# Patient Record
Sex: FEMALE | Race: BLACK OR AFRICAN AMERICAN | Employment: UNEMPLOYED | URBAN - METROPOLITAN AREA
[De-identification: names, ages, dates, MRNs, and addresses within clinical notes are randomized per-mention and may not be internally consistent; named-entity substitution may affect disease eponyms.]

---

## 2017-07-23 ENCOUNTER — HOSPITAL ENCOUNTER (EMERGENCY)
Age: 52
Discharge: HOME | End: 2017-07-23
Attending: EMERGENCY MEDICINE
Payer: SUBSIDIZED

## 2017-07-23 VITALS
SYSTOLIC BLOOD PRESSURE: 171 MMHG | RESPIRATION RATE: 16 BRPM | HEIGHT: 69 IN | HEART RATE: 97 BPM | OXYGEN SATURATION: 97 % | TEMPERATURE: 98.9 F | WEIGHT: 293 LBS | DIASTOLIC BLOOD PRESSURE: 63 MMHG | BODY MASS INDEX: 43.4 KG/M2

## 2017-07-23 DIAGNOSIS — S91.331A PENETRATING FOOT WOUND, RIGHT, INITIAL ENCOUNTER: Primary | ICD-10-CM

## 2017-07-23 PROCEDURE — 90715 TDAP VACCINE 7 YRS/> IM: CPT | Performed by: NURSE PRACTITIONER

## 2017-07-23 PROCEDURE — 74011250636 HC RX REV CODE- 250/636: Performed by: NURSE PRACTITIONER

## 2017-07-23 PROCEDURE — 99283 EMERGENCY DEPT VISIT LOW MDM: CPT | Performed by: NURSE PRACTITIONER

## 2017-07-23 PROCEDURE — 90471 IMMUNIZATION ADMIN: CPT | Performed by: NURSE PRACTITIONER

## 2017-07-23 PROCEDURE — 74011250637 HC RX REV CODE- 250/637: Performed by: NURSE PRACTITIONER

## 2017-07-23 RX ORDER — CEPHALEXIN 500 MG/1
500 CAPSULE ORAL 4 TIMES DAILY
Qty: 28 CAP | Refills: 0 | Status: SHIPPED | OUTPATIENT
Start: 2017-07-23 | End: 2017-07-30

## 2017-07-23 RX ORDER — CEPHALEXIN 500 MG/1
500 CAPSULE ORAL
Status: COMPLETED | OUTPATIENT
Start: 2017-07-23 | End: 2017-07-23

## 2017-07-23 RX ADMIN — CEPHALEXIN 500 MG: 500 CAPSULE ORAL at 17:52

## 2017-07-23 RX ADMIN — TETANUS TOXOID, REDUCED DIPHTHERIA TOXOID AND ACELLULAR PERTUSSIS VACCINE, ADSORBED 0.5 ML: 5; 2.5; 8; 8; 2.5 SUSPENSION INTRAMUSCULAR at 17:48

## 2017-07-23 NOTE — ED PROVIDER NOTES
HPI Comments: Patient presents with puncture wound to her right foot. She states she stepped on staples approx 1 week ago. She states she has had \"clear\" drainage from puncture wound. She states she is concerned about infection because she is a diabetic. No redness noted on exam. Patient states minimal tenderness to area when palpated. Patient denies fever. Patient is a 54 y.o. female presenting with skin problem. The history is provided by the patient. Skin Problem    This is a new problem. The current episode started more than 2 days ago. The problem has not changed since onset. The problem is associated with nothing. There has been no fever. The rash is present on the right foot. The pain is at a severity of 4/10. The pain is mild. The pain has been fluctuating since onset. Associated symptoms include pain. She has tried nothing for the symptoms. Past Medical History:   Diagnosis Date    Acute diastolic CHF (congestive heart failure) ([address] 3/27/8934    Acute systolic heart failure ([address] 1/21/2016    CHF (congestive heart failure) ([address] 1/19/2016    Chronic systolic heart failure ([address] 3/3/2016    Diabetes ([address]     diet controlled    DVT (deep venous thrombosis) ([address] 1/24/2016    Dyspnea 1/21/2016    HTN (hypertension) 1/19/2016    Hypertension     in past    Morbid obesity ([address] 1/19/2016    SVT (supraventricular tachycardia) ([address] 1/20/2016    Ventricular tachycardia (paroxysmal) ([address] 1/23/2016       Past Surgical History:   Procedure Laterality Date    ABDOMEN SURGERY PROC UNLISTED      gastric bypass    GASTRIC BYPASS,OBESE<150CM REYNALDO-EN-Y      HX PACEMAKER           History reviewed. No pertinent family history. Social History     Social History    Marital status:      Spouse name: N/A    Number of children: N/A    Years of education: N/A     Occupational History    Not on file.      Social History Main Topics    Smoking status: Never Smoker    Smokeless tobacco: Never Used    Alcohol use No    Drug use: No    Sexual activity: No     Other Topics Concern    Not on file     Social History Narrative         ALLERGIES: Review of patient's allergies indicates no known allergies. Review of Systems   Constitutional: Negative for chills and fever. Respiratory: Negative for cough and shortness of breath. Cardiovascular: Negative for chest pain. Gastrointestinal: Negative for abdominal pain. Musculoskeletal: Negative for arthralgias and myalgias. Skin: Positive for wound. Neurological: Negative for dizziness, weakness, numbness and headaches. Vitals:    07/23/17 1644   BP: 171/63   Pulse: 97   Resp: 16   Temp: 98.9 °F (37.2 °C)   SpO2: 97%   Weight: (!) 195 kg (430 lb)   Height: 5' 9\" (1.753 m)            Physical Exam   Constitutional: No distress. Cardiovascular: Normal rate and regular rhythm. No murmur heard. Pulmonary/Chest: Effort normal and breath sounds normal.   Musculoskeletal:        Right foot: There is tenderness. There is no swelling, normal capillary refill and no deformity. Feet:    Skin: Skin is warm and dry. She is not diaphoretic. No pallor. Psychiatric: She has a normal mood and affect. Her behavior is normal.   Nursing note and vitals reviewed. MDM  Number of Diagnoses or Management Options  Penetrating foot wound, right, initial encounter:   Diagnosis management comments: Patient given prescription for keflex. Patient states she currently does not have pcp because she does not have insurance at this time. I will give her contact information to social work in order to establish follow up with either pcp or wound care.      Patient Progress  Patient progress: stable    ED Course       Procedures

## 2017-07-23 NOTE — ED NOTES
Triple antibiotic ointment and nonadherent dressing applied to wound on plantar aspect of right foot.

## 2017-07-23 NOTE — ED TRIAGE NOTES
Patient advises that she stepped on a staple tee 1 week ago and was able to remove staple at that time. Patient advises wound is having some drainage at this time. Unable to visualize in triage.

## 2017-07-23 NOTE — DISCHARGE INSTRUCTIONS
Puncture Wounds: Care Instructions  Your Care Instructions    A puncture wound can happen anywhere on your body. These wounds tend to be narrower and deeper than cuts. A puncture wound is usually left open instead of being closed. This is because a puncture wound can be easily infected, and closing it can make infection even more likely. You will probably have a bandage over the wound. The doctor has checked you carefully, but problems can develop later. If you notice any problems or new symptoms, get medical treatment right away. Follow-up care is a key part of your treatment and safety. Be sure to make and go to all appointments, and call your doctor if you are having problems. It's also a good idea to know your test results and keep a list of the medicines you take. How can you care for yourself at home? · Keep the wound dry for the first 24 to 48 hours. After this, you can shower if your doctor okays it. Pat the wound dry. · Don't soak the wound, such as in a bathtub. Your doctor will tell you when it's safe to get the wound wet. · If your doctor told you how to care for your wound, follow your doctor's instructions. If you did not get instructions, follow this general advice:  ¨ After the first 24 to 48 hours, wash the wound with clean water 2 times a day. Don't use hydrogen peroxide or alcohol, which can slow healing. ¨ You may cover the wound with a thin layer of petroleum jelly, such as Vaseline, and a nonstick bandage. ¨ Apply more petroleum jelly and replace the bandage as needed. · Prop up the sore area on pillows anytime you sit or lie down during the next 3 days. Try to keep it above the level of your heart. This helps reduce swelling. · Avoid any activity that could cause your wound to get worse. · Be safe with medicines. Read and follow all instructions on the label. ¨ If the doctor gave you a prescription medicine for pain, take it as prescribed.   ¨ If you are not taking a prescription pain medicine, ask your doctor if you can take an over-the-counter medicine. · If your doctor prescribed antibiotics, take them as directed. Do not stop taking them just because you feel better. You need to take the full course of antibiotics. When should you call for help? Call your doctor now or seek immediate medical care if:  · You have new pain, or your pain gets worse. · The wound starts to bleed, and blood soaks through the bandage. Oozing small amounts of blood is normal.  · The skin near the wound is cold or pale or changes color. · You have tingling, weakness, or numbness near the wound. · You have trouble moving the area near the wound. · You have symptoms of infection, such as:  ¨ Increased pain, swelling, warmth, or redness around the wound. ¨ Red streaks leading from the wound. ¨ Pus draining from the wound. ¨ A fever. Watch closely for changes in your health, and be sure to contact your doctor if:  · The wound is not closing (getting smaller). · You do not get better as expected. Where can you learn more? Go to http://terri-arlette.info/. Enter F879 in the search box to learn more about \"Puncture Wounds: Care Instructions. \"  Current as of: March 20, 2017  Content Version: 11.3  © 7626-2136 [employer]. Care instructions adapted under license by [employer] (which disclaims liability or warranty for this information). If you have questions about a medical condition or this instruction, always ask your healthcare professional. [place] [address] any warranty or liability for your use of this information.

## 2017-07-24 NOTE — PROGRESS NOTES
Referral from weekend staff. Patient states does not have a PCP and also has no insurance. Noted that patient referred to [facility] program 1/26/16 and was suppose to have appointment with Heike Nevarez 2/3/16. Call to Heike Nevarez who states patient is not on active list.  Emailed another referral to Kayla Hagen with patient information.

## 2017-08-24 NOTE — PROGRESS NOTES
Patient called. States she is already established with [facility] and has an appointment there next Tuesday.

## 2017-08-30 ENCOUNTER — HOSPITAL ENCOUNTER (OUTPATIENT)
Dept: WOUND CARE | Age: 52
Discharge: HOME | End: 2017-08-30
Attending: PODIATRIST
Payer: MEDICAID

## 2017-08-30 PROCEDURE — 99214 OFFICE O/P EST MOD 30 MIN: CPT

## 2017-10-25 ENCOUNTER — HOSPITAL ENCOUNTER (OUTPATIENT)
Dept: WOUND CARE | Age: 52
Discharge: HOME | End: 2017-10-25
Attending: PODIATRIST
Payer: MEDICAID

## 2017-10-25 PROCEDURE — 97597 DBRDMT OPN WND 1ST 20 CM/<: CPT

## 2017-10-25 PROCEDURE — 99213 OFFICE O/P EST LOW 20 MIN: CPT

## 2017-11-22 ENCOUNTER — HOSPITAL ENCOUNTER (OUTPATIENT)
Dept: WOUND CARE | Age: 52
Discharge: HOME | End: 2017-11-22
Attending: PODIATRIST
Payer: MEDICAID

## 2017-11-22 PROCEDURE — 99213 OFFICE O/P EST LOW 20 MIN: CPT

## 2017-11-22 PROCEDURE — 11055 PARING/CUTG B9 HYPRKER LES 1: CPT

## 2017-12-20 ENCOUNTER — HOSPITAL ENCOUNTER (OUTPATIENT)
Dept: WOUND CARE | Age: 52
Discharge: HOME | End: 2017-12-20
Attending: PODIATRIST
Payer: MEDICAID

## 2017-12-20 PROCEDURE — 77030022298 HC DRSG ANTIMIC ACTICT S&N -A

## 2017-12-20 PROCEDURE — 97597 DBRDMT OPN WND 1ST 20 CM/<: CPT

## 2018-06-06 ENCOUNTER — HOSPITAL ENCOUNTER (OUTPATIENT)
Dept: WOUND CARE | Age: 53
Discharge: HOME | End: 2018-06-06
Attending: SURGERY
Payer: MEDICAID

## 2018-06-06 PROCEDURE — 97597 DBRDMT OPN WND 1ST 20 CM/<: CPT

## 2018-06-06 PROCEDURE — 99214 OFFICE O/P EST MOD 30 MIN: CPT

## 2018-06-07 ENCOUNTER — APPOINTMENT (OUTPATIENT)
Dept: WOUND CARE | Age: 53
End: 2018-06-07
Attending: SURGERY
Payer: MEDICAID

## 2018-07-11 ENCOUNTER — HOSPITAL ENCOUNTER (OUTPATIENT)
Dept: WOUND CARE | Age: 53
Discharge: HOME | End: 2018-07-11
Attending: SURGERY
Payer: MEDICAID

## 2018-07-11 PROCEDURE — 97597 DBRDMT OPN WND 1ST 20 CM/<: CPT

## 2018-08-01 ENCOUNTER — HOSPITAL ENCOUNTER (OUTPATIENT)
Dept: WOUND CARE | Age: 53
Discharge: HOME | End: 2018-08-01
Attending: SURGERY
Payer: MEDICAID

## 2018-08-01 PROCEDURE — 99213 OFFICE O/P EST LOW 20 MIN: CPT

## 2020-09-09 ENCOUNTER — HOSPITAL ENCOUNTER (OUTPATIENT)
Dept: WOUND CARE | Age: 55
Discharge: HOME | End: 2020-09-09
Attending: PODIATRIST
Payer: COMMERCIAL

## 2020-09-09 VITALS
TEMPERATURE: 98.1 F | SYSTOLIC BLOOD PRESSURE: 152 MMHG | BODY MASS INDEX: 43.4 KG/M2 | HEART RATE: 83 BPM | DIASTOLIC BLOOD PRESSURE: 90 MMHG | HEIGHT: 69 IN | WEIGHT: 293 LBS

## 2020-09-09 PROCEDURE — 97597 DBRDMT OPN WND 1ST 20 CM/<: CPT

## 2020-09-09 PROCEDURE — 99213 OFFICE O/P EST LOW 20 MIN: CPT

## 2020-09-09 PROCEDURE — 87077 CULTURE AEROBIC IDENTIFY: CPT

## 2020-09-09 PROCEDURE — 11045 DBRDMT SUBQ TISS EACH ADDL: CPT

## 2020-09-09 PROCEDURE — 87205 SMEAR GRAM STAIN: CPT

## 2020-09-09 PROCEDURE — 11042 DBRDMT SUBQ TIS 1ST 20SQCM/<: CPT

## 2020-09-09 PROCEDURE — 87186 SC STD MICRODIL/AGAR DIL: CPT

## 2020-09-09 RX ORDER — GABAPENTIN 300 MG/1
300 CAPSULE ORAL 3 TIMES DAILY
COMMUNITY

## 2020-09-09 RX ORDER — SODIUM HYPOCHLORITE 2.5 MG/ML
5 SOLUTION TOPICAL DAILY
Qty: 1 BOTTLE | Refills: 1 | Status: SHIPPED | OUTPATIENT
Start: 2020-09-09 | End: 2020-10-21

## 2020-09-09 NOTE — PROCEDURES
Wound Center Debridement    Patient: Una Mcgovern MRN: [id number]  SSN: [id number]    YOB: 1965  Age: 48 y.o.   Sex: female      September 9, 2020     Procedure Performed By: Favian Durant DPM    Wound: 2 Left  Non Pressure  Foot To Fat Layer    Type of Debridement:  Surgical      Time Out Taken: Yes    Pain Control: Insensate    Level: Skin/Subcutaneous Tissue    Type of Tissue Removed: Slough and Subcutaneous    Frequency of Debridements: PRN    Consent in chart     Instrument: Blade     Bleeding: Minimal     Hemostasis: Pressure     Procedural Pain: Insensate    Post-Procedural Pain: Insensate    Pre-Debridement Measurements: 5.5 x 5 x 0.2 cm    Post-Debridement Measurements: 4 x 3 x 0.2 cm    Surface Area Debrided: 12 sq. cm    Response to Procedure: tolerated the procedure well with no complications

## 2020-09-09 NOTE — WOUND CARE
09/09/20 1051 Wound Foot Right;Plantar #1 Date First Assessed/Time First Assessed: 09/09/20 1050   Present on Hospital Admission: Yes  Wound Approximate Age at First Assessment (Weeks): 8 weeks  Primary Wound Type: Diabetic  Location: Foot  Wound Location Orientation: Right;Plantar  Wound Kiran. .. Dressing Status Old drainage Dressing Type Adhesive wound dressing (Band-Aid) Non-staged Wound Description Full thickness Wound Length (cm) 1.6 cm Wound Width (cm) 2.3 cm Wound Depth (cm) 0.7 cm Wound Surface Area (cm^2) 3.68 cm^2 Wound Volume (cm^3) 2.58 cm^3 Condition of Base Granulation Condition of Edges Calloused Tissue Type Percent Red 100 Drainage Amount Moderate Drainage Color Serosanguinous Wound Odor None Cleansing and Cleansing Agents  Normal saline Wound Heel Left;Lateral #2 Date First Assessed/Time First Assessed: 09/09/20 1051   Present on Hospital Admission: Yes  Wound Approximate Age at First Assessment (Weeks): 7 weeks  Primary Wound Type: Diabetic  Location: Heel  Wound Location Orientation: Left;Lateral  Wound Desc. .. Dressing Type Open to air Non-staged Wound Description Full thickness Wound Length (cm) 5.5 cm Wound Width (cm) 5 cm Wound Depth (cm) 0.2 cm Wound Surface Area (cm^2) 27.5 cm^2 Wound Volume (cm^3) 5.5 cm^3 Condition of [facility]; Other (comment) 
(loose callous) Tissue Type Percent Yellow 100 Drainage Amount Large Drainage Color Serosanguinous Wound Odor Foul Cleansing and Cleansing Agents  Normal saline

## 2020-09-09 NOTE — WOUND CARE
[address] [place] [address] [address] Phone: [phone] Fax: [phone] Patient: Gloria Aleman MRN: [id number]  SSN: [id number] YOB: 1965  Age: 48 y.o. Sex: female Return Appointment: 2 weeks with Nasima Lopze DPM 
 
Instructions: Left foot: 
Clean wound with saline. Dakin's solution 0.25%-apply to wound on gauze or packing strip. Cover with ABD and wrap with rolled gauze. Change daily. *wound culture obtained of left foot wound today. Right foot: 
Clean wound with saline. Apply collagen with silver to wound bed. Cover with ABD and wrap with rolled gauze. Change every other day. Do not get wounds in shower. May purchase cast cover to keep dry. Patient is to remain out of work for 6 weeks. Should you experience increased redness, swelling, pain, foul odor, size of wound(s), or have a temperature over 101 degrees please contact the [address] [place] [address] Road at [phone] or if after hours contact your primary care physician or go to the hospital emergency department. Signed By: Reginaldo Lei RN September 9, 2020

## 2020-09-09 NOTE — WOUND CARE
[facility] September 9, 2020 RE: Kj Sandoval To Whom It May Concern, This is to certify that Kj Sandoval is to remain out of work for 6 weeks. Approximate return date is 10/21/2020. Please feel free to contact my office if you have any questions or concerns. Thank you for your assistance in this matter. Sincerely, 
Dr. Clare Kenney, RN 
 
[address] [place] [address] Phone: [phone] Fax: [phone]

## 2020-09-09 NOTE — PROCEDURES
Wound Center Debridement    Patient: Valentin Rodas MRN: [id number]  SSN: [id number]    YOB: 1965  Age: 48 y.o.   Sex: female      September 9, 2020     Procedure Performed By: Annamaria Cruz DPM    Wound: 1 Right  Non Pressure  Foot Breakdown of Skin     Type of Debridement:  Selective      Time Out Taken: Yes    Pain Control: Insensate      Type of Tissue Removed: Biofilm and Callus    Frequency of Debridements: PRN    Consent in chart     Instrument: Blade     Bleeding: Minimal     Hemostasis: Pressure     Procedural Pain: Insensate    Post-Procedural Pain: Insensate    Pre-Debridement Measurements: 1.6 x 2.3 x 0.7 cm    Post-Debridement Measurements: 1.6 x 2.3 x 0.4 cm    Surface Area Debrided: 3.68 sq. cm    Response to Procedure: tolerated the procedure well with no complications

## 2020-09-09 NOTE — PROGRESS NOTES
Wound Center Progress Note    Patient: Ave Canseco MRN: [id number]  SSN: [id number]    YOB: 1965  Age: 48 y.o. Sex: female      Subjective:     Chief Complaint:  Ave Canseco is a 48 y.o. BLACK OR  female who presents with a wound to the left lower extremity at the plantar lateral heel and to the sub 5th metatarsal head right foot . These began 2 months ago after a long car ride. She has not been treating at home. She notes odor to the left foot. She has moved to [facility] since last seen here but wants to travel here for care. She currently works on her feet daily. Her last A1c was 6.6. History of Present Illness:     Nature: Painless  Location: as above  Duration: July 2020  Onset: Patient states it started as pressure  Course: Worsening  Aggravating/Alleviating: Worsened with pressure and dependency, improved with rest  Treatment: none    Past Medical History:   Diagnosis Date    Acute diastolic CHF (congestive heart failure) ([address] 9/38/6713    Acute systolic heart failure ([address] 1/21/2016    CHF (congestive heart failure) ([address] 1/19/2016    Chronic systolic heart failure ([address] 3/3/2016    Diabetes ([address]     diet controlled    DVT (deep venous thrombosis) ([address] 1/24/2016    Dyspnea 1/21/2016    HTN (hypertension) 1/19/2016    Hypertension     in past    Morbid obesity ([address] 1/19/2016    SVT (supraventricular tachycardia) ([address] 1/20/2016    Ventricular tachycardia (paroxysmal) ([address] 1/23/2016      Past Surgical History:   Procedure Laterality Date    ABDOMEN SURGERY PROC UNLISTED      gastric bypass    GASTRIC BYPASS,OBESE<150CM REYNALDO-EN-Y      HX PACEMAKER       History reviewed. No pertinent family history. Social History     Tobacco Use    Smoking status: Never Smoker    Smokeless tobacco: Never Used   Substance Use Topics    Alcohol use: No       Prior to Admission medications    Medication Sig Start Date End Date Taking?  Authorizing Provider   gabapentin (NEURONTIN) 300 mg capsule Take 300 mg by mouth three (3) times daily. Yes Provider, Historical   losartan (COZAAR) 50 mg tablet Take 1 Tab by mouth daily. 6/15/17   Mayra Guerrier MD   multivitamin, tx-iron-ca-min (THERA-M W/ IRON) 9 mg iron-400 mcg tab tablet Take 1 Tab by mouth daily. Provider, Historical   aspirin (ASPIRIN) 325 mg tablet Take 1 Tab by mouth daily. 8/31/16   Mayra Guerrier MD   spironolactone (ALDACTONE) 25 mg tablet Take  by mouth daily. Provider, Historical   furosemide (LASIX) 40 mg tablet Take 1 Tab by mouth two (2) times a day. /Dr Sonja Gary [place] [facility] Lic #[id number]  Patient taking differently: Take 40 mg by mouth daily. /Dr Romelia José #[id number] 1/27/16   Nader Organ., PA   carvedilol (COREG) 25 mg tablet Take 1 Tab by mouth two (2) times daily (with meals). /Dr Sonja Gary [place] [facility] Lic #[id number] 1/27/16   [place] Organ., PA     Allergies   Allergen Reactions    Metformin Nausea Only        Review of Systems:  The patient has no difficulty with chest pain or shortness of breath. No fever or chills. No Nausea, vomiting or diarrhea. Comprehensive review of systems was otherwise unremarkable except as noted above. Lab Results   Component Value Date/Time    Hemoglobin A1c 7.0 (H) 01/20/2016 04:55 AM        Immunization History   Administered Date(s) Administered    Influenza Vaccine (Quad) PF 01/27/2016    Pneumococcal Polysaccharide (PPSV-23) 01/27/2016    Tdap 07/23/2017       Body mass index is 73.6 kg/m². Counseling regarding nutrition done: Yes. Recommend Jaden nutritional supplement for wound healing. Current medications:  Current Outpatient Medications   Medication Sig Dispense Refill    gabapentin (NEURONTIN) 300 mg capsule Take 300 mg by mouth three (3) times daily.  losartan (COZAAR) 50 mg tablet Take 1 Tab by mouth daily.  30 Tab 1    multivitamin, tx-iron-ca-min (THERA-M W/ IRON) 9 mg iron-400 mcg tab tablet Take 1 Tab by mouth daily.  aspirin (ASPIRIN) 325 mg tablet Take 1 Tab by mouth daily. 90 Tab 3    spironolactone (ALDACTONE) 25 mg tablet Take  by mouth daily.  furosemide (LASIX) 40 mg tablet Take 1 Tab by mouth two (2) times a day. /Dr Clotilde Carroll [facility] Lic #[id number] (Patient taking differently: Take 40 mg by mouth daily. /Dr Clotilde Carroll [facility] Lic #[id number]) 60 Tab 0    carvedilol (COREG) 25 mg tablet Take 1 Tab by mouth two (2) times daily (with meals). /Dr Clotilde Carroll [facility] Lic #[id number] [facility]#[id number] 60 Tab 0     Current Facility-Administered Medications   Medication Dose Route Frequency Provider Last Rate Last Dose    sodium hypochlorite (HALF STRENGTH DAKIN'S) 0.25% irrigation (bottle) 30 mL  30 mL Topical ONCE Lisandra Chavez DPM             Objective:     Physical Exam:     Visit Vitals  /90 (BP 1 Location: Left arm, BP Patient Position: At rest)   Pulse 83   Temp 98.1 °F (36.7 °C)   Ht 5' 9\" (1.753 m)   Wt (!) 226.1 kg (498 lb 6.4 oz)   BMI 73.60 kg/m²       General: well developed, well nourished, pleasant , NAD. Hygiene good  Psych: cooperative. Pleasant. No anxiety or depression. Normal mood and affect. HEENT: Normocephalic, atraumatic. EOMI. Conjunctiva clear. No scleral icterus. Neck: Normal range of motion. No masses. Chest: Good air entry bilaterally. Respirations nonlabored  Cardio: Normal heart sounds,no rubs, murmurs or gallops  Abdomen: Soft, nontender, nondistended, normoactive bowel sounds    Vascular: DP and PT pulses are palpable at 2/4 bilateral. Skin temperature is uniform from proximal to distal bilateral. Hair growth is absent bilateral. No erythema, edema, heat is appreciated bilateral. No evidence of cellulitis. Derm: Nails 1-5 bilateral are thickened, discolored and with subungual debris. No paronychial infections are noted. Skin is atrophic and xerotic.  No subcutaneous masses or hyperpigmented lesions are present. Neuro: Epicritic sensation is absent bilateral. Protective sensation is absent with 5.07 SWMF testing to all 10 sites bilateral. Muscle tone and bulk is symmetric bilateral.  Msk: Muscle strength is 5/5 for all prime movers of the foot bilateral. ROM of all joints is full and fluid without pain. No effusions are palpable. Full thickness ulceration is noted to the plantar lateral left heel. Hyperkeratotic rim with fibrous, stringy base. No focal erythema, edema, purulence. Malodor is present. Soft end feel with no bone exposed or palpable. No undermining or sinus track is noted. No fluctuance with palpation. Additional wound to the sub 5th metatarsal head right foot with thick hyperkeratotic rim and granular bed. No odor.              Ulcer Description:   Wound Foot Right;Plantar #1 (Active)   Dressing Status Old drainage 09/09/20 1051   Dressing Type Adhesive wound dressing (Band-Aid) 09/09/20 1051   Non-staged Wound Description Full thickness 09/09/20 1051   Banks Grading Scale 0-5  Grade 2 09/09/20 1051   Wound Length (cm) 1.6 cm 09/09/20 1051   Wound Width (cm) 2.3 cm 09/09/20 1051   Wound Depth (cm) 0.7 cm 09/09/20 1051   Wound Surface Area (cm^2) 3.68 cm^2 09/09/20 1051   Wound Volume (cm^3) 2.58 cm^3 09/09/20 1051   Condition of Base Granulation 09/09/20 1051   Condition of Edges Calloused 09/09/20 1051   Tissue Type Percent Red 100 09/09/20 1051   Drainage Amount Moderate 09/09/20 1051   Drainage Color Serosanguinous 09/09/20 1051   Wound Odor None 09/09/20 1051   Cleansing and Cleansing Agents  Normal saline 09/09/20 1051   Number of days: 0       Wound Heel Left;Lateral #2 (Active)   Dressing Type Open to air 09/09/20 1051   Non-staged Wound Description Full thickness 09/09/20 1051   Banks Grading Scale 0-5  Grade 3 09/09/20 1051   Wound Length (cm) 5.5 cm 09/09/20 1051   Wound Width (cm) 5 cm 09/09/20 1051   Wound Depth (cm) 0.2 cm 09/09/20 1051   Wound Surface Area (cm^2) 27.5 cm^2 09/09/20 1051   Wound Volume (cm^3) 5.5 cm^3 09/09/20 1051   Condition of [facility]; Other (comment) 09/09/20 1051   Tissue Type Percent Yellow 100 09/09/20 1051   Drainage Amount Large 09/09/20 1051   Drainage Color Serosanguinous 09/09/20 1051   Wound Odor Foul 09/09/20 1051   Cleansing and Cleansing Agents  Normal saline 09/09/20 1051   Number of days: 0                 Data Review:   No results found for this or any previous visit (from the past 336 hour(s)). I independently reviewed recent labs, pathology reports, microbiology reports and radiology studies as noted above. Assessment:     48 y.o. female with diabetic ulceration to the left heel and right sub 5th metatarsal head    Plan:     Patient was examined and evaluated today. They were educated on the barriers to healing. Culture taken left heel wound  - will prescribe when results received. Surgical debridement performed left heel with #15 blade - refer to debridement note. Begin dakins daily to the wound. Right wound with selective debridement. Begin collagen with silver. Recommend 6 weeks out of work to allo full healing. Return in 2 weeks due to distance. Recommend referral to closer facility and she declined. Any procedures done today in the wound center are documented in a seperate note in connect care made part of this record by reference. Patient instructed on the following: Follow all wound dressing instructions. Do not get dressing or wound wet. May shower if wound can be effectively kept dry. Cast covers may be purchased at [facility] and [facility]. Should you experience increased redness, swelling, pain, foul odor, size of wound(s), or have a temperature over 101 degrees please contact the [address] [place] [address] Road at [phone] or if after hours contact your primary care physician or go to the hospital emergency department.     Orders Placed This Encounter    CULTURE, WOUND W GRAM STAIN     Left foot wound     Standing Status: Standing     Number of Occurrences:   1    WOUND CARE, DRESSING CHANGE     Left foot:  Clean wound with saline. Dakin's solution 0.25%-apply to wound on gauze or packing strip. Cover with ABD and wrap with rolled gauze. Change daily. *wound culture obtained of left foot wound today. Right foot:  Clean wound with saline. Apply collagen with silver to wound bed. Cover with ABD and wrap with rolled gauze. Change every other day. Do not get wounds in shower. May purchase cast cover to keep dry. Standing Status:   Standing     Number of Occurrences:   1    gabapentin (NEURONTIN) 300 mg capsule     Sig: Take 300 mg by mouth three (3) times daily.  sodium hypochlorite (HALF STRENGTH DAKIN'S) 0.25% irrigation (bottle) 30 mL    sodium hypochlorite (Dakin's Solution) external solution     Sig: Apply 5 mL to affected area daily.      Dispense:  1 Bottle     Refill:  1       Follow-up Information     Follow up With Specialties Details Why Contact Info    [address] In 2 weeks  [facility] [address] [place] [id number]  [phone]             Signed By: Lizette Hill DPM     September 9, 2020

## 2020-09-13 LAB
BACTERIA SPEC CULT: ABNORMAL
GRAM STN SPEC: ABNORMAL
SERVICE CMNT-IMP: ABNORMAL

## 2020-09-15 RX ORDER — CEPHALEXIN 500 MG/1
500 CAPSULE ORAL 3 TIMES DAILY
COMMUNITY
Start: 2020-09-15 | End: 2020-09-24

## 2020-09-23 ENCOUNTER — HOSPITAL ENCOUNTER (OUTPATIENT)
Dept: WOUND CARE | Age: 55
Discharge: HOME | End: 2020-09-23
Attending: PODIATRIST
Payer: COMMERCIAL

## 2020-09-23 VITALS
WEIGHT: 293 LBS | HEIGHT: 69 IN | DIASTOLIC BLOOD PRESSURE: 98 MMHG | OXYGEN SATURATION: 95 % | RESPIRATION RATE: 20 BRPM | HEART RATE: 92 BPM | SYSTOLIC BLOOD PRESSURE: 148 MMHG | TEMPERATURE: 98 F | BODY MASS INDEX: 43.4 KG/M2

## 2020-09-23 PROCEDURE — 97597 DBRDMT OPN WND 1ST 20 CM/<: CPT

## 2020-09-23 NOTE — WOUND CARE
09/23/20 1123 Wound Foot Right;Plantar #1 Date First Assessed/Time First Assessed: 09/09/20 1050   Present on Hospital Admission: Yes  Wound Approximate Age at First Assessment (Weeks): 8 weeks  Primary Wound Type: Diabetic  Location: Foot  Wound Location Orientation: Right;Plantar  Wound Kiran. .. Dressing Status Old drainage Dressing Type  
(Collagen, Bandaid) Non-staged Wound Description Full thickness Wound Length (cm) 2 cm Wound Width (cm) 1.7 cm Wound Depth (cm) 0.5 cm Wound Surface Area (cm^2) 3.4 cm^2 Wound Volume (cm^3) 1.7 cm^3 Change in Wound Size % 7.61 Condition of Base Granulation Condition of Edges Calloused Tissue Type Percent Red 100 Drainage Amount Moderate Drainage Color Serosanguinous Wound Odor None Susana-wound Assessment Intact Cleansing and Cleansing Agents  Normal saline Wound Heel Left;Lateral #2 Date First Assessed/Time First Assessed: 09/09/20 1051   Present on Hospital Admission: Yes  Wound Approximate Age at First Assessment (Weeks): 7 weeks  Primary Wound Type: Diabetic  Location: Heel  Wound Location Orientation: Left;Lateral  Wound Desc. .. Dressing Status Breakthrough drainage Dressing Type (Dakins, ABD, Rolled GAuze) Non-staged Wound Description Full thickness Wound Length (cm) 5.3 cm Wound Width (cm) 4 cm Wound Depth (cm) 0.6 cm Wound Surface Area (cm^2) 21.2 cm^2 Wound Volume (cm^3) 12.72 cm^3 Change in Wound Size % 22.91 Condition of Base Slough;Granulation;Eschar Tissue Type Percent Red 80 Tissue Type Percent Yellow 20 Drainage Amount Large Drainage Color Serosanguinous Wound Odor Mild Susana-wound Assessment Intact Cleansing and Cleansing Agents  Normal saline Patient takes ASA 325mg Daily

## 2020-09-23 NOTE — WOUND CARE
[address] [place] [address], [facility] [address] Phone: [phone] Fax: [phone] Patient: Kal Quinones MRN: [id number]  SSN: [id number] YOB: 1965  Age: 48 y.o. Sex: female Return Appointment: 2 weeks with Joseph Fortune DPM 
 
Instructions: Left foot: 
Clean wound with saline. Dakin's solution 0.25%-apply to wound on gauze or packing strip. Cover with ABD and wrap with rolled gauze. Change daily. 
 
  
Right foot: 
Clean wound with saline. Apply collagen with silver to wound bed. Cover with ABD and wrap with rolled gauze. Change every other day. 
  
Do not get wounds in shower. May purchase cast cover to keep dry. 
  
Patient is to remain out of work for 6 weeks Should you experience increased redness, swelling, pain, foul odor, size of wound(s), or have a temperature over 101 degrees please contact the [address] [place] [address] at [phone] or if after hours contact your primary care physician or go to the hospital emergency department. Signed By: Kalen Spence RN September 23, 2020

## 2020-09-23 NOTE — DISCHARGE INSTRUCTIONS
[address]  [address], [address]   Phone: [phone]  Fax: [phone]    Patient: Valentin Rodas MRN: [id number]  SSN: [id number]    YOB: 1965  Age: 48 y.o. Sex: female       Return Appointment: 2 weeks with Annamaria Cruz DPM    Instructions: Left foot:  Clean wound with saline. Dakin's solution 0.25%-apply to wound on gauze or packing strip. Cover with ABD and wrap with rolled gauze. Change daily.       Right foot:  Clean wound with saline. Apply collagen with silver to wound bed. Cover with ABD and wrap with rolled gauze. Change every other day.     Do not get wounds in shower. May purchase cast cover to keep dry.     Patient is to remain out of work for 6 weeks    Should you experience increased redness, swelling, pain, foul odor, size of wound(s), or have a temperature over 101 degrees please contact the [address] [place] [address] Road at [phone] or if after hours contact your primary care physician or go to the hospital emergency department.     Signed By: Amor Antonio RN     September 23, 2020

## 2020-09-23 NOTE — PROCEDURES
Wound Center Debridement    Patient: Isabel Sumner MRN: [id number]  SSN: [id number]    YOB: 1965  Age: 48 y.o.   Sex: female      September 23, 2020     Procedure Performed By: Angeles Garcia DPM    Wound: 2 Left  Non Pressure  Heel & Midfoot Breakdown of Skin     Type of Debridement:  Selective      Time Out Taken: Yes    Pain Control: Insensate      Type of Tissue Removed: Callus, Dermis and Epidermis    Frequency of Debridements: PRN    Consent in chart     Instrument: Blade     Bleeding: Minimal     Hemostasis: n/a      Procedural Pain: Insensate    Post-Procedural Pain: Insensate    Pre-Debridement Measurements: 5.3 x 4.0 x 0.6 cm    Post-Debridement Measurements: 5.3 x 4.0 x 0.6 cm    Surface Area Debrided: 21.2 sq. cm    Response to Procedure: tolerated the procedure well with no complications

## 2020-09-23 NOTE — PROGRESS NOTES
Wound Center Progress Note    Patient: Gloria Aleman MRN: [id number]  SSN: [id number]    YOB: 1965  Age: 48 y.o. Sex: female      Subjective:     Chief Complaint:  Gloria Aleman is a 48 y.o. 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 She received her oral antibiotics and remains on them. She notes no remaining odor and less drainage. History of Present Illness:     Nature: Painless  Location: as above  Duration: July 2020  Onset: Patient states it started as pressure  Course: Improving  Aggravating/Alleviating: Worsened with pressure and dependency, improved with rest  Treatment: dakins left, collagen with silver right    Past Medical History:   Diagnosis Date    Acute diastolic CHF (congestive heart failure) ([address] 7/95/0747    Acute systolic heart failure ([address] 1/21/2016    CHF (congestive heart failure) ([address] 1/19/2016    Chronic systolic heart failure ([address] 3/3/2016    Diabetes ([address]     diet controlled    DVT (deep venous thrombosis) ([address] 1/24/2016    Dyspnea 1/21/2016    HTN (hypertension) 1/19/2016    Hypertension     in past    Morbid obesity ([address] 1/19/2016    SVT (supraventricular tachycardia) ([address] 1/20/2016    Ventricular tachycardia (paroxysmal) ([address] 1/23/2016      Past Surgical History:   Procedure Laterality Date    ABDOMEN SURGERY PROC UNLISTED      gastric bypass    GASTRIC BYPASS,OBESE<150CM REYNALDO-EN-Y      HX PACEMAKER       History reviewed. No pertinent family history. Social History     Tobacco Use    Smoking status: Never Smoker    Smokeless tobacco: Never Used   Substance Use Topics    Alcohol use: No       Prior to Admission medications    Medication Sig Start Date End Date Taking? Authorizing Provider   cephALEXin (Keflex) 500 mg capsule Take 500 mg by mouth three (3) times daily.  9/15/20 9/24/20  Provider, Historical   gabapentin (NEURONTIN) 300 mg capsule Take 300 mg by mouth three (3) times daily. Provider, Historical   sodium hypochlorite (Dakin's Solution) external solution Apply 5 mL to affected area daily. 9/9/20   Boy Chavez, DPASTER   losartan (COZAAR) 50 mg tablet Take 1 Tab by mouth daily. 6/15/17   Carri Carranza MD   multivitamin, tx-iron-ca-min (THERA-M W/ IRON) 9 mg iron-400 mcg tab tablet Take 1 Tab by mouth daily. Provider, Historical   aspirin (ASPIRIN) 325 mg tablet Take 1 Tab by mouth daily. 8/31/16   Carri Carranza MD   spironolactone (ALDACTONE) 25 mg tablet Take  by mouth daily. Provider, Historical   furosemide (LASIX) 40 mg tablet Take 1 Tab by mouth two (2) times a day. /Dr Alisia Andersen [place] [facility] #[id number] EWB#[id number]  Patient taking differently: Take 40 mg by mouth daily. /Dr Leeanna Chino #[id number] [facility]#[id number] 1/27/16   Faisal Cassette., PA   carvedilol (COREG) 25 mg tablet Take 1 Tab by mouth two (2) times daily (with meals). /Dr Alisia Andersen [place] [facility] #[id number] [facility]#[id number] 1/27/16   Faisal Cassette., PA     Allergies   Allergen Reactions    Metformin Nausea Only        Review of Systems:  The patient has no difficulty with chest pain or shortness of breath. No fever or chills. No Nausea, vomiting or diarrhea. Comprehensive review of systems was otherwise unremarkable except as noted above. Lab Results   Component Value Date/Time    Hemoglobin A1c 7.0 (H) 01/20/2016 04:55 AM        Immunization History   Administered Date(s) Administered    Influenza Vaccine (Quad) PF 01/27/2016    Pneumococcal Polysaccharide (PPSV-23) 01/27/2016    Tdap 07/23/2017       Body mass index is 72.86 kg/m². Counseling regarding nutrition done: Yes. Recommend Jaden nutritional supplement for wound healing. Current medications:  Current Outpatient Medications   Medication Sig Dispense Refill    cephALEXin (Keflex) 500 mg capsule Take 500 mg by mouth three (3) times daily.  gabapentin (NEURONTIN) 300 mg capsule Take 300 mg by mouth three (3) times daily.  sodium hypochlorite (Dakin's Solution) external solution Apply 5 mL to affected area daily. 1 Bottle 1    losartan (COZAAR) 50 mg tablet Take 1 Tab by mouth daily. 30 Tab 1    multivitamin, tx-iron-ca-min (THERA-M W/ IRON) 9 mg iron-400 mcg tab tablet Take 1 Tab by mouth daily.  aspirin (ASPIRIN) 325 mg tablet Take 1 Tab by mouth daily. 90 Tab 3    spironolactone (ALDACTONE) 25 mg tablet Take  by mouth daily.  furosemide (LASIX) 40 mg tablet Take 1 Tab by mouth two (2) times a day. /Dr Gabrielle Haider [place] [facility] #[id number] [facility]#[id number] (Patient taking differently: Take 40 mg by mouth daily. /Dr Gabrielle Haider [place] [facility] #[id number] [facility]#[id number]) 60 Tab 0    carvedilol (COREG) 25 mg tablet Take 1 Tab by mouth two (2) times daily (with meals). /Dr Gabrielle Haider [place] [facility] #[id number] [facility]#[id number] 60 Tab 0         Objective:     Physical Exam:     Visit Vitals  BP (!) 148/98 (BP 1 Location: Left arm, BP Patient Position: At rest)   Pulse 92   Temp 98 °F (36.7 °C)   Resp 20   Ht 5' 9\" (1.753 m)   Wt (!) 223.8 kg (493 lb 6.4 oz)   SpO2 95%   BMI 72.86 kg/m²       General: well developed, well nourished, pleasant , NAD. Hygiene good  Psych: cooperative. Pleasant. No anxiety or depression. Normal mood and affect. HEENT: Normocephalic, atraumatic. EOMI. Conjunctiva clear. No scleral icterus. Neck: Normal range of motion. No masses. Chest: Good air entry bilaterally. Respirations nonlabored  Cardio: Normal heart sounds,no rubs, murmurs or gallops  Abdomen: Soft, nontender, nondistended, normoactive bowel sounds    Vascular: DP and PT pulses are palpable at 2/4 bilateral. Skin temperature is uniform from proximal to distal bilateral. Hair growth is absent bilateral. No erythema, edema, heat is appreciated bilateral. No evidence of cellulitis. Derm: Nails 1-5 bilateral are thickened, discolored and with subungual debris.  No paronychial infections are noted. Skin is atrophic and xerotic. No subcutaneous masses or hyperpigmented lesions are present. Neuro: Epicritic sensation is absent bilateral. Protective sensation is absent with 5.07 SWMF testing to all 10 sites bilateral. Muscle tone and bulk is symmetric bilateral.  Msk: Muscle strength is 5/5 for all prime movers of the foot bilateral. ROM of all joints is full and fluid without pain. No effusions are palpable. Full thickness ulceration is noted to the plantar lateral left heel. Hyperkeratotic rim with fibrous, stringy base. Increasing rim of granulation with fibrosis remaining to the center only. No focal erythema, edema, purulence. Malodor is resolved. Soft end feel with no bone exposed or palpable. No undermining or sinus track is noted. No fluctuance with palpation. Wound to the sub 5th metatarsal head right foot with thinner hyperkeratotic rim and granular bed. No odor.              Ulcer Description:   Wound Foot Right;Plantar #1 (Active)   Dressing Status Old drainage 09/23/20 1123   Dressing Type Adhesive wound dressing (Band-Aid) 09/09/20 1051   Non-staged Wound Description Full thickness 09/23/20 1123   Banks Grading Scale 0-5  Grade 2 09/09/20 1051   Wound Length (cm) 2 cm 09/23/20 1123   Wound Width (cm) 1.7 cm 09/23/20 1123   Wound Depth (cm) 0.5 cm 09/23/20 1123   Wound Surface Area (cm^2) 3.4 cm^2 09/23/20 1123   Wound Volume (cm^3) 1.7 cm^3 09/23/20 1123   Change in Wound Size % 7.61 09/23/20 1123   Condition of Base Granulation 09/23/20 1123   Condition of Edges Calloused 09/23/20 1123   Tissue Type Percent Red 100 09/23/20 1123   Drainage Amount Moderate 09/23/20 1123   Drainage Color Serosanguinous 09/23/20 1123   Wound Odor None 09/23/20 1123   Susana-wound Assessment Intact 09/23/20 1123   Cleansing and Cleansing Agents  Normal saline 09/23/20 1123   Number of days: 14       Wound Heel Left;Lateral #2 (Active)   Dressing Status Breakthrough drainage 09/23/20 1123 Dressing Type Open to air 09/09/20 1051   Non-staged Wound Description Full thickness 09/23/20 1123   Banks Grading Scale 0-5  Grade 3 09/09/20 1051   Wound Length (cm) 5.3 cm 09/23/20 1123   Wound Width (cm) 4 cm 09/23/20 1123   Wound Depth (cm) 0.6 cm 09/23/20 1123   Wound Surface Area (cm^2) 21.2 cm^2 09/23/20 1123   Wound Volume (cm^3) 12.72 cm^3 09/23/20 1123   Change in Wound Size % 22.91 09/23/20 1123   Condition of Base Slough;Granulation;Eschar 09/23/20 1123   Tissue Type Percent Red 80 09/23/20 1123   Tissue Type Percent Yellow 20 09/23/20 1123   Drainage Amount Large 09/23/20 1123   Drainage Color Serosanguinous 09/23/20 1123   Wound Odor Mild 09/23/20 1123   Susana-wound Assessment Intact 09/23/20 1123   Cleansing and Cleansing Agents  Normal saline 09/23/20 1123   Number of days: 14                 Data Review:   No results found for this or any previous visit (from the past 336 hour(s)). I independently reviewed recent labs, pathology reports, microbiology reports and radiology studies as noted above. Assessment:     48 y.o. female with diabetic ulceration to the left heel and right sub 5th metatarsal head    Plan:     Patient was examined and evaluated today. They were educated on the barriers to healing. Wounds responding well. Continue dakins left and silver collagen right. Remain out of work. Return in 2 weeks due to distance. Any procedures done today in the wound center are documented in a seperate note in [facility] care made part of this record by reference. Patient instructed on the following: Follow all wound dressing instructions. Do not get dressing or wound wet. May shower if wound can be effectively kept dry. Cast covers may be purchased at [facility] and hospitals.     Should you experience increased redness, swelling, pain, foul odor, size of wound(s), or have a temperature over 101 degrees please contact the [facility] [address] at [phone] or if after hours contact your primary care physician or go to the hospital emergency department. Orders Placed This Encounter    WOUND CARE, DRESSING CHANGE     Left foot:  Clean wound with saline. Dakin's solution 0.25%-apply to wound on gauze or packing strip. Cover with ABD and wrap with rolled gauze. Change daily.       Right foot:  Clean wound with saline. Apply collagen with silver to wound bed. Cover with ABD and wrap with rolled gauze. Change every other day.     Do not get wounds in shower.  May purchase cast cover to keep dry.     Patient is to remain out of work for 6 weeks.        Standing Status:   Standing     Number of Occurrences:   1       Follow-up Information     Follow up With Specialties Details Why Contact Info    [address] In 2 weeks For wound re-check [address] [place] [id number] [facility] [id number] [phone]             Signed By: David Maurice DPM     September 23, 2020

## 2020-10-07 ENCOUNTER — HOSPITAL ENCOUNTER (OUTPATIENT)
Dept: WOUND CARE | Age: 55
Discharge: HOME | End: 2020-10-07
Attending: PODIATRIST
Payer: COMMERCIAL

## 2020-10-07 VITALS
HEART RATE: 84 BPM | WEIGHT: 293 LBS | SYSTOLIC BLOOD PRESSURE: 158 MMHG | HEIGHT: 69 IN | BODY MASS INDEX: 43.4 KG/M2 | DIASTOLIC BLOOD PRESSURE: 102 MMHG | TEMPERATURE: 97.7 F

## 2020-10-07 PROCEDURE — 97597 DBRDMT OPN WND 1ST 20 CM/<: CPT

## 2020-10-07 NOTE — WOUND CARE
10/07/20 1125 Wound Foot Right;Plantar #1 Date First Assessed/Time First Assessed: 09/09/20 1050   Present on Hospital Admission: Yes  Wound Approximate Age at First Assessment (Weeks): 8 weeks  Primary Wound Type: Diabetic  Location: Foot  Wound Location Orientation: Right;Plantar  Wound Ik[place]. .. Dressing Status Clean, dry, and intact Dressing Type  
(collagen, bandaid) Non-staged Wound Description Full thickness Wound Length (cm) 1.5 cm Wound Width (cm) 1.5 cm Wound Depth (cm) 0.2 cm Wound Surface Area (cm^2) 2.25 cm^2 Wound Volume (cm^3) 0.45 cm^3 Change in Wound Size % 38.86 Condition of Base Granulation Condition of Edges Calloused Tissue Type Percent Red 100 Drainage Amount Moderate Drainage Color Serosanguinous Wound Odor None Cleansing and Cleansing Agents  Normal saline Wound Heel Left;Lateral #2 Date First Assessed/Time First Assessed: 09/09/20 1051   Present on Hospital Admission: Yes  Wound Approximate Age at First Assessment (Weeks): 7 weeks  Primary Wound Type: Diabetic  Location: Heel  Wound Location Orientation: Left;Lateral  Wound Desc. .. Dressing Status Old drainage Dressing Type  
(dakins gauze, rolled gauze) Non-staged Wound Description Full thickness Wound Length (cm) 4.7 cm Wound Width (cm) 3.2 cm Wound Depth (cm) 0.1 cm Wound Surface Area (cm^2) 15.04 cm^2 Wound Volume (cm^3) 1.5 cm^3 Change in Wound Size % 45.31 Condition of Base Granulation Tissue Type Percent Red 100 Drainage Amount Moderate Drainage Color Serosanguinous Wound Odor None Cleansing and Cleansing Agents  Normal saline Patient is currently taking aspirin

## 2020-10-07 NOTE — PROCEDURES
Wound Center Debridement    Patient: Bel Oconnor MRN: [id number]  SSN: [id number]    YOB: 1965  Age: 48 y.o. Sex: female      October 7, 2020     Procedure Performed By: Frieda Phillips DPM    Wound: 1 Right  Non Pressure  Heel & Midfoot To Fat Layer    Type of Debridement:  Selective      Time Out Taken: Yes    Pain Control: Insensate      Type of Tissue Removed: Biofilm and Callus    Frequency of Debridements: PRN    Consent in chart     Instrument: Curette      Bleeding: Minimal     Hemostasis: Pressure     Procedural Pain: Insensate    Post-Procedural Pain: 0    Pre-Debridement Measurements: 1.5 x 1.5 x 0.2 cm    Post-Debridement Measurements: 1.5 x 1.5 x 0.1 cm    Surface Area Debrided: 2.25 sq. cm    Response to Procedure: tolerated the procedure well with no complications                 Wound Center Debridement    Patient: Bel Oconnor MRN: [id number]  SSN: [id number]    YOB: 1965  Age: 48 y.o.   Sex: female      October 7, 2020     Procedure Performed By: Frieda Phillips DPM    Wound: 2 Left  Non Pressure  Heel & Midfoot To Fat Layer    Type of Debridement:  Selective      Time Out Taken: Yes    Pain Control: Insensate      Type of Tissue Removed: Biofilm    Frequency of Debridements: PRN    Consent in chart     Instrument: Curette      Bleeding: Minimal     Hemostasis: Pressure     Procedural Pain: Insensate    Post-Procedural Pain: Insensate    Pre-Debridement Measurements: 4.7 x 3.2 x 0.1 cm    Post-Debridement Measurements: 4.7 x 3.2 x 0.1 cm    Surface Area Debrided: 15.04 sq. cm    Response to Procedure: tolerated the procedure well with no complications

## 2020-10-07 NOTE — PROGRESS NOTES
Wound Center Progress Note    Patient: Araceli Olson MRN: [id number]  SSN: [id number]    YOB: 1965  Age: 48 y.o. Sex: female      Subjective:     Chief Complaint:  Araceli Olson is a 48 y.o. 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 She continues out of work. History of Present Illness:     Nature: Painless  Location: as above  Duration: July 2020  Onset: Patient states it started as pressure  Course: Improving  Aggravating/Alleviating: Worsened with pressure and dependency, improved with rest  Treatment: dakins left, collagen with silver right    Past Medical History:   Diagnosis Date    Acute diastolic CHF (congestive heart failure) ([address] 5/58/1073    Acute systolic heart failure ([address] 1/21/2016    CHF (congestive heart failure) ([address] 1/19/2016    Chronic systolic heart failure ([address] 3/3/2016    Diabetes ([address]     diet controlled    DVT (deep venous thrombosis) ([address] 1/24/2016    Dyspnea 1/21/2016    HTN (hypertension) 1/19/2016    Hypertension     in past    Morbid obesity ([address] 1/19/2016    SVT (supraventricular tachycardia) ([address] 1/20/2016    Ventricular tachycardia (paroxysmal) ([address] 1/23/2016      Past Surgical History:   Procedure Laterality Date    ABDOMEN SURGERY PROC UNLISTED      gastric bypass    GASTRIC BYPASS,OBESE<150CM REYNALDO-EN-Y      HX PACEMAKER       History reviewed. No pertinent family history. Social History     Tobacco Use    Smoking status: Never Smoker    Smokeless tobacco: Never Used   Substance Use Topics    Alcohol use: No       Prior to Admission medications    Medication Sig Start Date End Date Taking? Authorizing Provider   gabapentin (NEURONTIN) 300 mg capsule Take 300 mg by mouth three (3) times daily.     Provider, Historical   sodium hypochlorite (Dakin's Solution) external solution Apply 5 mL to affected area daily. 9/9/20   Tal Chavez DPM   losartan (COZAAR) 50 mg tablet Take 1 Tab by mouth daily. 6/15/17   Mimi Milligan MD   multivitamin, tx-iron-ca-min (THERA-M W/ IRON) 9 mg iron-400 mcg tab tablet Take 1 Tab by mouth daily. Provider, Historical   aspirin (ASPIRIN) 325 mg tablet Take 1 Tab by mouth daily. 8/31/16   Mimi Milligan MD   spironolactone (ALDACTONE) 25 mg tablet Take  by mouth daily. Provider, Historical   furosemide (LASIX) 40 mg tablet Take 1 Tab by mouth two (2) times a day. /Dr Han Morris 14 Lic #[id number]#[id number]  Patient taking differently: Take 40 mg by mouth daily. /Dr Rosanna Orta #[id number] HVA#[id number] 1/27/16   Catalina Casey., PA   carvedilol (COREG) 25 mg tablet Take 1 Tab by mouth two (2) times daily (with meals). /Dr Han Morris 14 Lic #[id number] 1/27/16   Catalina Casey., PA     Allergies   Allergen Reactions    Metformin Nausea Only        Review of Systems:  The patient has no difficulty with chest pain or shortness of breath. No fever or chills. No Nausea, vomiting or diarrhea. Comprehensive review of systems was otherwise unremarkable except as noted above. Lab Results   Component Value Date/Time    Hemoglobin A1c 7.0 (H) 01/20/2016 04:55 AM        Immunization History   Administered Date(s) Administered    Influenza Vaccine [employer]) PF (>6 Mo Flulaval, Fluarix, and >3 Yrs Afluria, Fluzone 16916) 01/27/2016    Pneumococcal Polysaccharide (PPSV-23) 01/27/2016    Tdap 07/23/2017       Body mass index is 73.31 kg/m². Counseling regarding nutrition done: Yes. Recommend Jaden nutritional supplement for wound healing. Current medications:  Current Outpatient Medications   Medication Sig Dispense Refill    gabapentin (NEURONTIN) 300 mg capsule Take 300 mg by mouth three (3) times daily.  sodium hypochlorite (Dakin's Solution) external solution Apply 5 mL to affected area daily.  1 Bottle 1    losartan (COZAAR) 50 mg tablet Take 1 Tab by mouth daily. 30 Tab 1    multivitamin, tx-iron-ca-min (THERA-M W/ IRON) 9 mg iron-400 mcg tab tablet Take 1 Tab by mouth daily.  aspirin (ASPIRIN) 325 mg tablet Take 1 Tab by mouth daily. 90 Tab 3    spironolactone (ALDACTONE) 25 mg tablet Take  by mouth daily.  furosemide (LASIX) 40 mg tablet Take 1 Tab by mouth two (2) times a day. /Dr David Armstrong [facility] Lic #[id number] (Patient taking differently: Take 40 mg by mouth daily. /Dr David Armstrong [facility] Lic #[id number] WQA#[id number]) 60 Tab 0    carvedilol (COREG) 25 mg tablet Take 1 Tab by mouth two (2) times daily (with meals). /Dr David Armstrong [facility] Lic #[id number]#[id number] 60 Tab 0         Objective:     Physical Exam:     Visit Vitals  BP (!) 158/102 (BP 1 Location: Left arm, BP Patient Position: At rest)   Pulse 84   Temp 97.7 °F (36.5 °C)   Ht 5' 9\" (1.753 m)   Wt (!) 225.2 kg (496 lb 6.4 oz)   BMI 73.31 kg/m²       General: well developed, well nourished, pleasant , NAD. Hygiene good  Psych: cooperative. Pleasant. No anxiety or depression. Normal mood and affect. HEENT: Normocephalic, atraumatic. EOMI. Conjunctiva clear. No scleral icterus. Neck: Normal range of motion. No masses. Chest: Good air entry bilaterally. Respirations nonlabored  Cardio: Normal heart sounds,no rubs, murmurs or gallops  Abdomen: Soft, nontender, nondistended, normoactive bowel sounds    Vascular: DP and PT pulses are palpable at 2/4 bilateral. Skin temperature is uniform from proximal to distal bilateral. Hair growth is absent bilateral. No erythema, edema, heat is appreciated bilateral. No evidence of cellulitis. Derm: Nails 1-5 bilateral are thickened, discolored and with subungual debris. No paronychial infections are noted. Skin is atrophic and xerotic. No subcutaneous masses or hyperpigmented lesions are present.    Neuro: Epicritic sensation is absent bilateral. Protective sensation is absent with 5.07 SWMF testing to all 10 sites bilateral. Muscle tone and bulk is symmetric bilateral.  Msk: Muscle strength is 5/5 for all prime movers of the foot bilateral. ROM of all joints is full and fluid without pain. No effusions are palpable. Full thickness ulceration is noted to the plantar lateral left heel. Hyperkeratotic rim with granular base. No focal erythema, edema, purulence. Malodor is resolved. Soft end feel with no bone exposed or palpable. No undermining or sinus track is noted. No fluctuance with palpation. Wound to the sub 5th metatarsal head right foot with thick hyperkeratotic rim and granular bed. No odor.              Ulcer Description:   Wound Foot Right;Plantar #1 (Active)   Dressing Status Clean, dry, and intact 10/07/20 1125   Dressing Type Adhesive wound dressing (Band-Aid) 09/09/20 1051   Non-staged Wound Description Full thickness 10/07/20 1125   Banks Grading Scale 0-5  Grade 2 09/09/20 1051   Wound Length (cm) 1.5 cm 10/07/20 1125   Wound Width (cm) 1.5 cm 10/07/20 1125   Wound Depth (cm) 0.2 cm 10/07/20 1125   Wound Surface Area (cm^2) 2.25 cm^2 10/07/20 1125   Wound Volume (cm^3) 0.45 cm^3 10/07/20 1125   Change in Wound Size % 38.86 10/07/20 1125   Condition of Base Granulation 10/07/20 1125   Condition of Edges Calloused 10/07/20 1125   Tissue Type Percent Red 100 10/07/20 1125   Drainage Amount Moderate 10/07/20 1125   Drainage Color Serosanguinous 10/07/20 1125   Wound Odor None 10/07/20 1125   Susana-wound Assessment Intact 09/23/20 1123   Cleansing and Cleansing Agents  Normal saline 10/07/20 1125   Dressing Changed Changed/New 09/23/20 1123   Number of days: 28       Wound Heel Left;Lateral #2 (Active)   Dressing Status Old drainage 10/07/20 1125   Dressing Type Open to air 09/09/20 1051   Non-staged Wound Description Full thickness 10/07/20 1125   Banks Grading Scale 0-5  Grade 3 09/09/20 1051   Wound Length (cm) 4.7 cm 10/07/20 1125   Wound Width (cm) 3.2 cm 10/07/20 1125   Wound Depth (cm) 0.1 cm 10/07/20 1125   Wound Surface Area (cm^2) 15.04 cm^2 10/07/20 1125   Wound Volume (cm^3) 1.5 cm^3 10/07/20 1125   Change in Wound Size % 45.31 10/07/20 1125   Condition of Base Granulation 10/07/20 1125   Tissue Type Percent Red 100 10/07/20 1125   Tissue Type Percent Yellow 20 09/23/20 1123   Drainage Amount Moderate 10/07/20 1125   Drainage Color Serosanguinous 10/07/20 1125   Wound Odor None 10/07/20 1125   Susana-wound Assessment Intact 09/23/20 1123   Cleansing and Cleansing Agents  Normal saline 10/07/20 1125   Dressing Changed Changed/New 09/23/20 1123   Number of days: 28                 Data Review:   No results found for this or any previous visit (from the past 336 hour(s)). I independently reviewed recent labs, pathology reports, microbiology reports and radiology studies as noted above. Assessment:     48 y.o. female with diabetic ulceration to the left heel and right sub 5th metatarsal head    Plan:     Patient was examined and evaluated today. They were educated on the barriers to healing. For the left heel - begin hydrofera 3 times a week. For the right 5th - begin acticoat 3 times a week. Reduce activity by 1/2. Return in 2 weeks due to distance. Any procedures done today in the wound center are documented in a seperate note in connect care made part of this record by reference. Patient instructed on the following: Follow all wound dressing instructions. Do not get dressing or wound wet. May shower if wound can be effectively kept dry. Cast covers may be purchased at [facility] and [facility]. Should you experience increased redness, swelling, pain, foul odor, size of wound(s), or have a temperature over 101 degrees please contact the [address] [place] [address] Road at [phone] or if after hours contact your primary care physician or go to the hospital emergency department. Orders Placed This Encounter    WOUND CARE, DRESSING CHANGE     Left foot:  Clean wound with saline.   Hydrofera Blue: Cut to wound size, moisten with saline, and apply to wound bed. Cover with ABD and wrap with rolled gauze. Change 3 times/week.        Right foot:  Clean wound with saline. Acticoat Flex 3: cut top approximate size of wound, apply to wound bed. Cover with ABD and wrap with rolled gauze. Change 3 times/week.     Do not get wounds in shower.  May purchase cast cover to keep dry.     Patient is to remain out of work for 6 weeks     Standing Status:   Standing     Number of Occurrences:   1       Follow-up Information     Follow up With Specialties Details Why Contact Info    [address] In 2 weeks  [facility] [id number] [facility] [address] [phone]             Signed By: Petr Morales DPM     October 7, 2020

## 2020-10-07 NOTE — WOUND CARE
[address] [place] [address] [address] Phone: [phone] Fax: [phone] Patient: Kareen Cunningham MRN: [id number]  SSN: [id number] YOB: 1965  Age: 48 y.o. Sex: female Return Appointment: 2 weeks with Agnes Robertson DPM 
 
Instructions: Left foot: 
Clean wound with saline. Hydrofera Blue: Cut to wound size, moisten with saline, and apply to wound bed. Cover with ABD and wrap with rolled gauze. Change 3 times/week. 
  
  
Right foot: 
Clean wound with saline. Acticoat Flex 3: cut top approximate size of wound, apply to wound bed. Cover with ABD and wrap with rolled gauze. Change 3 times/week. 
  
Do not get wounds in shower. May purchase cast cover to keep dry. 
  
Patient is to remain out of work for 6 weeks Should you experience increased redness, swelling, pain, foul odor, size of wound(s), or have a temperature over 101 degrees please contact the [address] [place] [address] Road at [phone] or if after hours contact your primary care physician or go to the hospital emergency department. Signed By: Barbie Marcus RN October 7, 2020

## 2020-10-21 ENCOUNTER — HOSPITAL ENCOUNTER (OUTPATIENT)
Dept: WOUND CARE | Age: 55
Discharge: HOME | End: 2020-10-21
Attending: PODIATRIST
Payer: COMMERCIAL

## 2020-10-21 VITALS
TEMPERATURE: 97.8 F | HEART RATE: 94 BPM | HEIGHT: 69 IN | SYSTOLIC BLOOD PRESSURE: 167 MMHG | WEIGHT: 293 LBS | DIASTOLIC BLOOD PRESSURE: 107 MMHG | BODY MASS INDEX: 43.4 KG/M2

## 2020-10-21 PROCEDURE — 99213 OFFICE O/P EST LOW 20 MIN: CPT

## 2020-10-21 NOTE — PROGRESS NOTES
Wound Center Progress Note    Patient: Pablo Dhaliwal MRN: [id number]  SSN: [id number]    YOB: 1965  Age: 48 y.o. Sex: female      Subjective:     Chief Complaint:  Pablo Dhaliwal is a 48 y.o. 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 She continues out of work but plans to return next week. History of Present Illness:     Nature: Painless  Location: as above  Duration: July 2020  Onset: Patient states it started as pressure  Course: Improving  Aggravating/Alleviating: Worsened with pressure and dependency, improved with rest  Treatment: acticoat right, hydrofera left    Past Medical History:   Diagnosis Date    Acute diastolic CHF (congestive heart failure) ([address] 2/27/4273    Acute systolic heart failure ([address] 1/21/2016    CHF (congestive heart failure) ([address] 1/19/2016    Chronic systolic heart failure ([address] 3/3/2016    Diabetes ([address]     diet controlled    DVT (deep venous thrombosis) ([address] 1/24/2016    Dyspnea 1/21/2016    HTN (hypertension) 1/19/2016    Hypertension     in past    Morbid obesity ([address] 1/19/2016    SVT (supraventricular tachycardia) ([address] 1/20/2016    Ventricular tachycardia (paroxysmal) ([address] 1/23/2016      Past Surgical History:   Procedure Laterality Date    ABDOMEN SURGERY PROC UNLISTED      gastric bypass    GASTRIC BYPASS,OBESE<150CM REYNALDO-EN-Y      HX PACEMAKER       History reviewed. No pertinent family history. Social History     Tobacco Use    Smoking status: Never Smoker    Smokeless tobacco: Never Used   Substance Use Topics    Alcohol use: No       Prior to Admission medications    Medication Sig Start Date End Date Taking? Authorizing Provider   gabapentin (NEURONTIN) 300 mg capsule Take 300 mg by mouth three (3) times daily. Yes Provider, Historical   losartan (COZAAR) 50 mg tablet Take 1 Tab by mouth daily. 6/15/17  Yes Lady Perez MD   multivitamin, tx-iron-ca-min (THERA-M W/ IRON) 9 mg iron-400 mcg tab tablet Take 1 Tab by mouth daily. Yes Provider, Historical   aspirin (ASPIRIN) 325 mg tablet Take 1 Tab by mouth daily. 8/31/16  Yes Lady Perez MD   spironolactone (ALDACTONE) 25 mg tablet Take  by mouth daily. Yes Provider, Historical   furosemide (LASIX) 40 mg tablet Take 1 Tab by mouth two (2) times a day. /Dr Ott [facility] #[id number] PGZ#[id number]  Patient taking differently: Take 40 mg by mouth daily. /Dr Deana Crook #[id number] [facility]#[id number] 1/27/16  Yes DEENA Nguyen   carvedilol (COREG) 25 mg tablet Take 1 Tab by mouth two (2) times daily (with meals). /Dr Deana Crook #[id number] XCZ#[id number] 1/27/16  Yes DEENA Nguyen     Allergies   Allergen Reactions    Metformin Nausea Only        Review of Systems:  The patient has no difficulty with chest pain or shortness of breath. No fever or chills. No Nausea, vomiting or diarrhea. Comprehensive review of systems was otherwise unremarkable except as noted above. Lab Results   Component Value Date/Time    Hemoglobin A1c 7.0 (H) 01/20/2016 04:55 AM        Immunization History   Administered Date(s) Administered    Influenza Vaccine [employer]) PF (>6 Mo Flulaval, Fluarix, and >3 Yrs Afluria, Fluzone 21900) 01/27/2016    Pneumococcal Polysaccharide (PPSV-23) 01/27/2016    Tdap 07/23/2017       Body mass index is 72.51 kg/m². Counseling regarding nutrition done: Yes. Recommend Jaden nutritional supplement for wound healing. Current medications:  Current Outpatient Medications   Medication Sig Dispense Refill    gabapentin (NEURONTIN) 300 mg capsule Take 300 mg by mouth three (3) times daily.  losartan (COZAAR) 50 mg tablet Take 1 Tab by mouth daily. 30 Tab 1    multivitamin, tx-iron-ca-min (THERA-M W/ IRON) 9 mg iron-400 mcg tab tablet Take 1 Tab by mouth daily.       aspirin (ASPIRIN) 325 mg tablet Take 1 Tab by mouth daily. 90 Tab 3    spironolactone (ALDACTONE) 25 mg tablet Take  by mouth daily.  furosemide (LASIX) 40 mg tablet Take 1 Tab by mouth two (2) times a day. /Dr Philippe Chisholm [place] Lic #[id number] (Patient taking differently: Take 40 mg by mouth daily. /Dr Philippe Chisholm [place] Lic #[id number]#[id number]) 60 Tab 0    carvedilol (COREG) 25 mg tablet Take 1 Tab by mouth two (2) times daily (with meals). /Dr Philippe Chisholm [place] Lic #[id number]#[id number] 60 Tab 0         Objective:     Physical Exam:     Visit Vitals  BP (!) 167/107 (BP 1 Location: Left arm, BP Patient Position: Sitting)   Pulse 94   Temp 97.8 °F (36.6 °C)   Ht 5' 9\" (1.753 m)   Wt (!) 222.7 kg (491 lb)   BMI 72.51 kg/m²       General: well developed, well nourished, pleasant , NAD. Hygiene good  Psych: cooperative. Pleasant. No anxiety or depression. Normal mood and affect. HEENT: Normocephalic, atraumatic. EOMI. Conjunctiva clear. No scleral icterus. Neck: Normal range of motion. No masses. Chest: Good air entry bilaterally. Respirations nonlabored  Cardio: Normal heart sounds,no rubs, murmurs or gallops  Abdomen: Soft, nontender, nondistended, normoactive bowel sounds    Vascular: DP and PT pulses are palpable at 2/4 bilateral. Skin temperature is uniform from proximal to distal bilateral. Hair growth is absent bilateral. No erythema, edema, heat is appreciated bilateral. No evidence of cellulitis. Derm: Nails 1-5 bilateral are thickened, discolored and with subungual debris. No paronychial infections are noted. Skin is atrophic and xerotic. No subcutaneous masses or hyperpigmented lesions are present. Neuro: Epicritic sensation is absent bilateral. Protective sensation is absent with 5.07 SWMF testing to all 10 sites bilateral. Muscle tone and bulk is symmetric bilateral.  Msk: Muscle strength is 5/5 for all prime movers of the foot bilateral. ROM of all joints is full and fluid without pain. No effusions are palpable. Full thickness ulceration is noted to the plantar lateral left heel. Hyperkeratotic rim with granular base. No focal erythema, edema, purulence. Soft end feel with no bone exposed or palpable. No undermining or sinus track is noted. No fluctuance with palpation. Wound to the sub 5th metatarsal head right foot with thin hyperkeratotic rim and granular bed. No odor.              Ulcer Description:   Wound Foot Right;Plantar #1 (Active)   Dressing Status Clean, dry, and intact 10/21/20 1126   Dressing Type Adhesive wound dressing (Band-Aid) 09/09/20 1051   Non-staged Wound Description Full thickness 10/07/20 1125   Banks Grading Scale 0-5  Grade 2 10/21/20 1126   Wound Length (cm) 1.2 cm 10/21/20 1126   Wound Width (cm) 1.2 cm 10/21/20 1126   Wound Depth (cm) 0.2 cm 10/21/20 1126   Wound Surface Area (cm^2) 1.44 cm^2 10/21/20 1126   Wound Volume (cm^3) 0.29 cm^3 10/21/20 1126   Change in Wound Size % 60.87 10/21/20 1126   Condition of Base Granulation 10/21/20 1126   Condition of Edges Calloused 10/21/20 1126   Tissue Type Percent Red 100 10/21/20 1126   Drainage Amount Moderate 10/21/20 1126   Drainage Color Serosanguinous 10/21/20 1126   Wound Odor None 10/21/20 1126   Susana-wound Assessment Intact 10/21/20 1126   Cleansing and Cleansing Agents  Soap and water 10/21/20 1126   Dressing Changed Changed/New 09/23/20 1123   Number of days: 42       Wound Heel Left;Lateral #2 (Active)   Dressing Status Clean, dry, and intact 10/21/20 1126   Dressing Type Open to air 09/09/20 1051   Non-staged Wound Description Full thickness 10/07/20 1125   Banks Grading Scale 0-5  Grade 3 10/21/20 1126   Wound Length (cm) 4.3 cm 10/21/20 1126   Wound Width (cm) 2.9 cm 10/21/20 1126   Wound Depth (cm) 0.1 cm 10/21/20 1126   Wound Surface Area (cm^2) 12.47 cm^2 10/21/20 1126   Wound Volume (cm^3) 1.25 cm^3 10/21/20 1126   Change in Wound Size % 54.65 10/21/20 1126   Condition of Base Granulation 10/21/20 1126   Tissue Type Percent Red 100 10/21/20 1126   Tissue Type Percent Yellow 20 09/23/20 1123   Drainage Amount Moderate 10/21/20 1126   Drainage Color Serosanguinous 10/21/20 1126   Wound Odor None 10/21/20 1126   Susana-wound Assessment Intact 10/21/20 1126   Cleansing and Cleansing Agents  Soap and water 10/21/20 1126   Dressing Changed Changed/New 10/21/20 1126   Number of days: 42                   Data Review:   No results found for this or any previous visit (from the past 336 hour(s)). I independently reviewed recent labs, pathology reports, microbiology reports and radiology studies as noted above. Assessment:     48 y.o. female with diabetic ulceration to the left heel and right sub 5th metatarsal head    Plan:     Patient was examined and evaluated today. They were educated on the barriers to healing. Begin acticoat to both wounds 3 times a week. She may return to work Monday as planned but if wounds stall or worsen he will have to return to leave. She was given prescription and DMV form for handicap placard. Return in 2 weeks. Any procedures done today in the wound center are documented in a seperate note in [facility] care made part of this record by reference. Patient instructed on the following: Follow all wound dressing instructions. Do not get dressing or wound wet. May shower if wound can be effectively kept dry. Cast covers may be purchased at [facility] and [facility]. Should you experience increased redness, swelling, pain, foul odor, size of wound(s), or have a temperature over 101 degrees please contact the [address] [place] [address] Road at [phone] or if after hours contact your primary care physician or go to the hospital emergency department. Orders Placed This Encounter    WOUND CARE, DRESSING CHANGE     Left heel, Right plantar foot  Cleanse wound and periwound with wound cleanser or normal saline. Acticoat Flex 3: cut top approximate size of wound, apply to wound bed.    Secure with gauze or abd and rolled gauze.   Change 3x weekly. May return to work but continue to offload as much as possible. Limit walking. Prescription given for handicap placard at today's visit.      Standing Status:   Standing     Number of Occurrences:   1       Follow-up Information     Follow up With Specialties Details Why Contact Info    [address] In 2 weeks  [facility] [address] [facility] [address] [facility] [address] [phone]             Signed By: Antonio Bateman DPM     October 21, 2020

## 2020-10-21 NOTE — WOUND CARE
10/21/20 1126 Wound Heel Left;Lateral #2 Date First Assessed/Time First Assessed: 09/09/20 1051   Present on Hospital Admission: Yes  Wound Approximate Age at First Assessment (Weeks): 7 weeks  Primary Wound Type: Diabetic  Location: Heel  Wound Location Orientation: Left;Lateral  Wound Desc. .. Dressing Status Clean, dry, and intact Dressing Type  
(hydrofera blue, abd, rolled gauze) Wound Length (cm) 4.3 cm Wound Width (cm) 2.9 cm Wound Depth (cm) 0.1 cm Wound Surface Area (cm^2) 12.47 cm^2 Wound Volume (cm^3) 1.25 cm^3 Change in Wound Size % 54.65 Condition of Base Granulation Tissue Type Percent Red 100 Drainage Amount Moderate Drainage Color Serosanguinous Wound Odor None Susana-wound Assessment Intact Cleansing and Cleansing Agents  Soap and water Dressing Changed Changed/New Wound Foot Right;Plantar #1 Date First Assessed/Time First Assessed: 09/09/20 1050   Present on Hospital Admission: Yes  Wound Approximate Age at First Assessment (Weeks): 8 weeks  Primary Wound Type: Diabetic  Location: Foot  Wound Location Orientation: Right;Plantar  Wound Kiran. .. Dressing Status Clean, dry, and intact Dressing Type  
(acticoat, abd, rolled gauze) Wound Length (cm) 1.2 cm Wound Width (cm) 1.2 cm Wound Depth (cm) 0.2 cm Wound Surface Area (cm^2) 1.44 cm^2 Wound Volume (cm^3) 0.29 cm^3 Change in Wound Size % 60.87 Condition of Base Granulation Condition of Edges Calloused Tissue Type Percent Red 100 Drainage Amount Moderate Drainage Color Serosanguinous Wound Odor None Susana-wound Assessment Intact Cleansing and Cleansing Agents  Soap and water Patient is on an anti coagulant daily [id number].

## 2020-10-21 NOTE — DISCHARGE INSTRUCTIONS
[address]  [address], [address]  Phone: [phone]  Fax: [phone]    Patient: Polly Jennings MRN: [id number]  SSN: [id number]    YOB: 1965  Age: 48 y.o. Sex: female       Return Appointment: 2 weeks with Maria Dolores Dewitt DPM    Instructions: Left heel, Right plantar foot  Cleanse wound and periwound with wound cleanser or normal saline. Acticoat Flex 3: cut top approximate size of wound, apply to wound bed. Secure with gauze or abd and rolled gauze. Change 3x weekly. May return to work but continue to offload as much as possible. Limit walking. Prescription given for handicap placard at today's visit. Should you experience increased redness, swelling, pain, foul odor, size of wound(s), or have a temperature over 101 degrees please contact the [facility] [address] at [phone] or if after hours contact your primary care physician or go to the hospital emergency department.     Signed By: Angel Mittal     October 21, 2020

## 2020-10-21 NOTE — WOUND CARE
[address] [place] [address] [place], [address] Phone: [phone] Fax: [phone] Patient: Yanira Williamson MRN: [id number]  SSN: [id number] YOB: 1965  Age: 48 y.o. Sex: female Return Appointment: 2 weeks with Noah Mills DPM 
 
Instructions: Left heel, Right plantar foot Cleanse wound and periwound with wound cleanser or normal saline. Acticoat Flex 3: cut top approximate size of wound, apply to wound bed. Secure with gauze or abd and rolled gauze. Change 3x weekly. May return to work but continue to offload as much as possible. Limit walking. Prescription given for handicap placard at today's visit. Should you experience increased redness, swelling, pain, foul odor, size of wound(s), or have a temperature over 101 degrees please contact the [address] [place] [address] Road at [phone] or if after hours contact your primary care physician or go to the hospital emergency department. Signed By: Hillary Gomez October 21, 2020

## 2020-11-04 ENCOUNTER — HOSPITAL ENCOUNTER (OUTPATIENT)
Dept: WOUND CARE | Age: 55
Discharge: HOME | End: 2020-11-04
Attending: PODIATRIST
Payer: COMMERCIAL

## 2020-11-04 VITALS
BODY MASS INDEX: 43.4 KG/M2 | TEMPERATURE: 97.6 F | SYSTOLIC BLOOD PRESSURE: 146 MMHG | HEIGHT: 69 IN | DIASTOLIC BLOOD PRESSURE: 79 MMHG | HEART RATE: 82 BPM | WEIGHT: 293 LBS

## 2020-11-04 DIAGNOSIS — L97.929 IDIOPATHIC CHRONIC VENOUS HYPERTENSION OF LEFT LOWER EXTREMITY WITH ULCER AND INFLAMMATION (HCC): Primary | ICD-10-CM

## 2020-11-04 DIAGNOSIS — L97.919 IDIOPATHIC CHRONIC VENOUS HYPERTENSION OF RIGHT LOWER EXTREMITY WITH ULCER AND INFLAMMATION (HCC): ICD-10-CM

## 2020-11-04 DIAGNOSIS — I87.331 IDIOPATHIC CHRONIC VENOUS HYPERTENSION OF RIGHT LOWER EXTREMITY WITH ULCER AND INFLAMMATION (HCC): ICD-10-CM

## 2020-11-04 DIAGNOSIS — L97.422 DIABETIC ULCER OF LEFT HEEL ASSOCIATED WITH TYPE 2 DIABETES MELLITUS, WITH FAT LAYER EXPOSED (HCC): ICD-10-CM

## 2020-11-04 DIAGNOSIS — L97.422 ULCER OF LEFT HEEL, WITH FAT LAYER EXPOSED (HCC): ICD-10-CM

## 2020-11-04 DIAGNOSIS — I87.332 IDIOPATHIC CHRONIC VENOUS HYPERTENSION OF LEFT LOWER EXTREMITY WITH ULCER AND INFLAMMATION (HCC): Primary | ICD-10-CM

## 2020-11-04 DIAGNOSIS — E11.621 DIABETIC ULCER OF LEFT HEEL ASSOCIATED WITH TYPE 2 DIABETES MELLITUS, WITH FAT LAYER EXPOSED (HCC): ICD-10-CM

## 2020-11-04 DIAGNOSIS — L97.512 RIGHT FOOT ULCER, WITH FAT LAYER EXPOSED (HCC): ICD-10-CM

## 2020-11-04 PROBLEM — L97.522 DIABETIC ULCER OF LEFT FOOT ASSOCIATED WITH TYPE 2 DIABETES MELLITUS, WITH FAT LAYER EXPOSED (HCC): Status: ACTIVE | Noted: 2020-11-04

## 2020-11-04 PROCEDURE — 97597 DBRDMT OPN WND 1ST 20 CM/<: CPT

## 2020-11-04 NOTE — WOUND CARE
[address] [place] [address], [address] [address] Phone: [phone] Fax: [phone] Patient: Alejandrina Morillo MRN: [id number]  SSN: [id number] YOB: 1965  Age: 50 y.o. Sex: female Return Appointment: 1 week with Sasha Mccarthy DPM 
 
Instructions: Left heel, Right plantar foot Cleanse wound and periwound with wound cleanser or normal saline. Acticoat Flex 3: cut top approximate size of wound, apply to wound bed. Secure with gauze or abd and rolled gauze. Change 3x weekly.  
  
May return to work but continue to offload as much as possible. Limit walking. Wear darco shoe to bilateral feet. Purchase peg insert for right shoe. Should you experience increased redness, swelling, pain, foul odor, size of wound(s), or have a temperature over 101 degrees please contact the [address] [place] [address] Road at [phone] or if after hours contact your primary care physician or go to the hospital emergency department. Signed By: Tarik Carlson RN November 4, 2020

## 2020-11-04 NOTE — PROGRESS NOTES
[address] [place] [address] [address], [address] RECORD NUMBER:  [id number]  AGE: 50 y.o. RACE BLACK OR   GENDER: female  : 1965  EPISODE DATE:  2020    Subjective:     Chief Complaint   Patient presents with    Follow-up     left heel wound and right plantar foot wound        Araceli Olson is a 50 y.o. 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 She returned to work. HISTORY of PRESENT ILLNESS HPI    Nature: Painless  Location: as above  Duration: 2020  Onset: Patient states it started as pressure  Course: Improving  Aggravating/Alleviating: Worsened with pressure and dependency, improved with rest  Treatment: acticoat     Ulcer Identification:  Ulcer Type: diabetic    Contributing Factors: diabetes    Wound: grade 2 mahoney        PAST MEDICAL HISTORY    Past Medical History:   Diagnosis Date    Acute diastolic CHF (congestive heart failure) ([address]     Acute systolic heart failure ([address] 2016    CHF (congestive heart failure) ([address] 2016    Chronic systolic heart failure ([address] 3/3/2016    Diabetes ([address]     diet controlled    DVT (deep venous thrombosis) ([address] 2016    Dyspnea 2016    HTN (hypertension) 2016    Hypertension     in past    Morbid obesity ([address] 2016    SVT (supraventricular tachycardia) ([address] 2016    Ventricular tachycardia (paroxysmal) ([address] 2016        PAST SURGICAL HISTORY    Past Surgical History:   Procedure Laterality Date    ABDOMEN SURGERY PROC UNLISTED      gastric bypass    GASTRIC BYPASS,OBESE<150CM REYNALDO-EN-Y      HX PACEMAKER         FAMILY HISTORY    History reviewed. No pertinent family history.     SOCIAL HISTORY    Social History     Tobacco Use    Smoking status: Never Smoker    Smokeless tobacco: Never Used   Substance Use Topics    Alcohol use: No    Drug use: No       ALLERGIES    Allergies   Allergen Reactions    Metformin Nausea Only       MEDICATIONS    Current Outpatient Medications on File Prior to Encounter   Medication Sig Dispense Refill    gabapentin (NEURONTIN) 300 mg capsule Take 300 mg by mouth three (3) times daily.  losartan (COZAAR) 50 mg tablet Take 1 Tab by mouth daily. 30 Tab 1    multivitamin, tx-iron-ca-min (THERA-M W/ IRON) 9 mg iron-400 mcg tab tablet Take 1 Tab by mouth daily.  aspirin (ASPIRIN) 325 mg tablet Take 1 Tab by mouth daily. 90 Tab 3    spironolactone (ALDACTONE) 25 mg tablet Take  by mouth daily.  furosemide (LASIX) 40 mg tablet Take 1 Tab by mouth two (2) times a day. /Dr [facility] [place] [facility] Lic #[id number] KUZ#[id number] (Patient taking differently: Take 40 mg by mouth daily. /Dr [facility] Lic #[id number] FIU#[id number]) 60 Tab 0    carvedilol (COREG) 25 mg tablet Take 1 Tab by mouth two (2) times daily (with meals). /Dr [facility] [place] [facility] Lic #[id number] MIY#[id number] 60 Tab 0     No current facility-administered medications on file prior to encounter. REVIEW OF SYSTEMS    A comprehensive review of systems was negative except for that written in the HPI. Objective:     Visit Vitals  BP (!) 146/79 (BP 1 Location: Left arm, BP Patient Position: Sitting)   Pulse 82   Temp 97.6 °F (36.4 °C)   Ht 5' 9\" (1.753 m)   Wt (!) 224.6 kg (495 lb 3.2 oz)   BMI 73.13 kg/m²       Wt Readings from Last 3 Encounters:   11/04/20 (!) 224.6 kg (495 lb 3.2 oz)   10/21/20 (!) 222.7 kg (491 lb)   10/07/20 (!) 225.2 kg (496 lb 6.4 oz)       PHYSICAL EXAM    General: well developed, well nourished, pleasant , NAD. Hygiene good  Psych: cooperative. Pleasant. No anxiety or depression. Normal mood and affect. HEENT: Normocephalic, atraumatic. EOMI. Conjunctiva clear. No scleral icterus. Neck: Normal range of motion. No masses.   Chest: Good air entry bilaterally. Respirations nonlabored  Cardio: Normal heart sounds,no rubs, murmurs or gallops  Abdomen: Soft, nontender, nondistended, normoactive bowel sounds    Vascular: DP and PT pulses are palpable at 2/4 bilateral. Skin temperature is uniform from proximal to distal bilateral. Hair growth is absent bilateral. No erythema, edema, heat is appreciated bilateral. No evidence of cellulitis. Derm: Nails 1-5 bilateral are thickened, discolored and with subungual debris. No paronychial infections are noted. Skin is atrophic and xerotic. No subcutaneous masses or hyperpigmented lesions are present. Neuro: Epicritic sensation is absent bilateral. Protective sensation is absent with 5.07 SWMF testing to all 10 sites bilateral. Muscle tone and bulk is symmetric bilateral.  Msk: Muscle strength is 5/5 for all prime movers of the foot bilateral. ROM of all joints is full and fluid without pain. No effusions are palpable.       Full thickness ulceration is noted to the plantar lateral left heel. Hyperkeratotic rim with granular base. No focal erythema, edema, purulence. Soft end feel with no bone exposed or palpable. No undermining or sinus track is noted. No fluctuance with palpation. Wound to the sub 5th metatarsal head right foot with thin hyperkeratotic rim and granular bed. No odor.        Wound Foot Right;Plantar #1 (Active)   Wound Image    11/04/20 1126   Wound Etiology Diabetic Banks 2 11/04/20 1126   Dressing Status Clean;Dry; Intact 11/04/20 1126   Cleansed Cleansed with saline 11/04/20 1126   Dressing/Treatment Adhesive wound dressing (Band-Aid) 09/09/20 1051   Offloading for Diabetic Foot Ulcers Diabetic shoes/inserts 11/04/20 1126   Wound Length (cm) 1 cm 11/04/20 1126   Wound Width (cm) 0.8 cm 11/04/20 1126   Wound Depth (cm) 0.2 cm 11/04/20 1126   Wound Surface Area (cm^2) 0.8 cm^2 11/04/20 1126   Change in Wound Size % 78.26 11/04/20 1126   Wound Volume (cm^3) 0.16 cm^3 11/04/20 1126   Wound Healing % 94 11/04/20 1126   Post-Procedure Length (cm) 1.2 cm 11/04/20 1126   Post-Procedure Width (cm) 1 cm 11/04/20 1126   Post-Procedure Depth (cm) 0.2 cm 11/04/20 1126   Post-Procedure Surface Area (cm^2) 1.2 cm^2 11/04/20 1126   Post-Procedure Volume (cm^3) 0.24 cm^3 11/04/20 1126   Wound Assessment Granulation tissue 11/04/20 1126   Drainage Amount Small 11/04/20 1126   Drainage Description Serosanguinous 11/04/20 1126   Wound Odor None 11/04/20 1126   Susana-Wound/Incision Assessment Hyperkeratosis (Callous) 11/04/20 1126   Wound Thickness Description Full thickness 11/04/20 1126   Read-Only, Retired. Condition of Base Granulation 10/21/20 1126   Read-Only, Retired. Condition of Edges Calloused 10/21/20 1126   Read-Only, Retired. Tissue Type Percent Red 100 10/21/20 1126   Read-Only, Retired. Cleansing and Cleansing Agents Soap and water 10/21/20 1126   Number of days: 56       Wound Heel Left;Lateral #2 (Active)   Wound Image   11/04/20 1126   Wound Etiology Diabetic Banks 2 11/04/20 1126   Dressing Status Clean;Dry; Intact 11/04/20 1126   Cleansed Cleansed with saline 11/04/20 1126   Dressing/Treatment Open to air 09/09/20 1051   Wound Length (cm) 4.8 cm 11/04/20 1126   Wound Width (cm) 3 cm 11/04/20 1126   Wound Depth (cm) 0.1 cm 11/04/20 1126   Wound Surface Area (cm^2) 14.4 cm^2 11/04/20 1126   Change in Wound Size % 47.64 11/04/20 1126   Wound Volume (cm^3) 1.44 cm^3 11/04/20 1126   Wound Healing % 74 11/04/20 1126   Wound Assessment Granulation tissue 11/04/20 1126   Drainage Amount Moderate 11/04/20 1126   Drainage Description Serosanguinous 11/04/20 1126   Wound Odor None 11/04/20 1126   Susana-Wound/Incision Assessment Dry/flaky 11/04/20 1126   Wound Thickness Description Full thickness 10/07/20 1125   Read-Only, Retired. Condition of Base Granulation 10/21/20 1126   Read-Only, Retired. Tissue Type Percent Red 100 10/21/20 1126   Read-Only, Retired.  Tissue Type Percent Yellow 20 09/23/20 1123   Read-Only, Retired. Cleansing and Cleansing Agents Soap and water 10/21/20 1126   Number of days: 56       DATA REVIEW:  No results found for this or any previous visit (from the past 336 hour(s)). Assessment:      Problem List Items Addressed This Visit        Endocrine    Diabetic ulcer of left foot associated with type 2 diabetes mellitus, with fat layer exposed ([address]       Other    Ulcer of left heel, with fat layer exposed ([address]    Right foot ulcer, with fat layer exposed ([address]      Other Visit Diagnoses     Idiopathic chronic venous hypertension of left lower extremity with ulcer and inflammation ([address]    -  Primary    Idiopathic chronic venous hypertension of right lower extremity with ulcer and inflammation ([address]            Procedure Note  Indications:  Based on my examination of this patient's wound(s)/ulcer(s) today, debridement is not required to promote healing and evaluate the wound base. Plan:     Patient was examined and evaluated today. They were educated on the barriers to healing. Continue acticoat bilateral. Remain in offloading shoe left. Add peg insole for offloading right. Return in 1 week  Any procedures done today in the wound center are documented in a seperate note in connect care made part of this record by reference. Patient instructed on the following: Follow all wound dressing instructions. Do not get dressing or wound wet. May shower if wound can be effectively kept dry. Cast covers may be purchased at [facility] and [facility]. Should you experience increased redness, swelling, pain, foul odor, size of wound(s), or have a temperature over 101 degrees please contact the [address] [place] [address] Road at [phone] or if after hours contact your primary care physician or go to the hospital emergency department. Orders Placed This Encounter    WOUND CARE, DRESSING CHANGE     Left heel, Right plantar foot  Cleanse wound and periwound with wound cleanser or normal saline.   Acticoat Flex 3: cut top approximate size of wound, apply to wound bed. Secure with gauze or abd and rolled gauze. Change 3x weekly.      May return to work but continue to offload as much as possible. Limit walking. Wear darco shoe to bilateral feet. Purchase peg insert for right shoe. Standing Status:   Standing     Number of Occurrences:   1       Follow-up Information     Follow up With Specialties Details Why Contact Info    [address] In 1 week  Salvatore Sage [address] [address] [phone]           Treatment Note please see attached Discharge Instructions    Written patient dismissal instructions given to patient or POA.          Electronically signed by Ramón Estrada DPM on 11/4/2020 at 1:15 PM

## 2020-11-04 NOTE — WOUND CARE
11/04/20 1126 Wound Foot Right;Plantar #1 Date First Assessed/Time First Assessed: 09/09/20 1050   Present on Hospital Admission: Yes  Wound Approximate Age at First Assessment (Weeks): 8 weeks  Primary Wound Type: Diabetic  Location: Foot  Wound Location Orientation: Right;Plantar  Wound Kiran. .. Wound Image Wound Etiology Diabetic Luvenia Bobby 2 Dressing Status Clean;Dry; Intact Cleansed Cleansed with saline Dressing/Treatment  
(acticoat, gauze) Offloading for Diabetic Foot Ulcers Diabetic shoes/inserts Wound Length (cm) 1 cm Wound Width (cm) 0.8 cm Wound Depth (cm) 0.2 cm Wound Surface Area (cm^2) 0.8 cm^2 Change in Wound Size % 78.26 Wound Volume (cm^3) 0.16 cm^3 Wound Healing % 94 Wound Assessment Granulation tissue Drainage Amount Small Drainage Description Serosanguinous Wound Odor None Susana-Wound/Incision Assessment Hyperkeratosis (Callous) Wound Thickness Description Full thickness Wound Heel Left;Lateral #2 Date First Assessed/Time First Assessed: 09/09/20 1051   Present on Hospital Admission: Yes  Wound Approximate Age at First Assessment (Weeks): 7 weeks  Primary Wound Type: Diabetic  Location: Heel  Wound Location Orientation: Left;Lateral  Wound Desc. .. Wound Image Wound Etiology Diabetic Luvenia Bobby 2 Dressing Status Clean;Dry; Intact Cleansed Cleansed with saline Dressing/Treatment  
(hydrofera blue, gauze) Wound Length (cm) 4.8 cm Wound Width (cm) 3 cm Wound Depth (cm) 0.1 cm Wound Surface Area (cm^2) 14.4 cm^2 Change in Wound Size % 47.64 Wound Volume (cm^3) 1.44 cm^3 Wound Healing % 74 Wound Assessment Granulation tissue Drainage Amount Moderate Drainage Description Serosanguinous Wound Odor None Susana-Wound/Incision Assessment Dry/flaky Patient is taking aspirin

## 2020-12-16 ENCOUNTER — HOSPITAL ENCOUNTER (OUTPATIENT)
Dept: WOUND CARE | Age: 55
Discharge: HOME | End: 2020-12-16
Attending: PODIATRIST
Payer: COMMERCIAL

## 2020-12-16 VITALS
DIASTOLIC BLOOD PRESSURE: 111 MMHG | WEIGHT: 293 LBS | HEART RATE: 96 BPM | HEIGHT: 69 IN | TEMPERATURE: 97.6 F | BODY MASS INDEX: 43.4 KG/M2 | SYSTOLIC BLOOD PRESSURE: 159 MMHG

## 2020-12-16 DIAGNOSIS — E11.621 DIABETIC ULCER OF LEFT HEEL ASSOCIATED WITH TYPE 2 DIABETES MELLITUS, WITH FAT LAYER EXPOSED (HCC): Primary | ICD-10-CM

## 2020-12-16 DIAGNOSIS — L97.422 DIABETIC ULCER OF LEFT HEEL ASSOCIATED WITH TYPE 2 DIABETES MELLITUS, WITH FAT LAYER EXPOSED (HCC): Primary | ICD-10-CM

## 2020-12-16 DIAGNOSIS — L97.512 RIGHT FOOT ULCER, WITH FAT LAYER EXPOSED (HCC): ICD-10-CM

## 2020-12-16 PROCEDURE — 99213 OFFICE O/P EST LOW 20 MIN: CPT

## 2020-12-16 NOTE — WOUND CARE
[address] [place] [address], [address] Phone: [phone] Fax: [phone] Patient: Colton Waggoner MRN: [id number]  SSN: [id number] YOB: 1965  Age: 50 y.o. Sex: female Return Appointment: 1 month with Venessa Gtz DPM 
 
Instructions: Left heel, Right plantar foot Cleanse wound and periwound with wound cleanser or normal saline. Acticoat Flex 3: cut top approximate size of wound, apply to wound bed. Secure with gauze or abd and rolled gauze. Change 3x weekly.  
  
May return to work but continue to offload as much as possible. Limit walking. Wear darco shoe to bilateral feet. Purchase peg insert for right shoe. Should you experience increased redness, swelling, pain, foul odor, size of wound(s), or have a temperature over 101 degrees please contact the [address] [place] [address] Road at [phone] or if after hours contact your primary care physician or go to the hospital emergency department. Signed By: Dev Horowitz RN December 16, 2020

## 2020-12-16 NOTE — WOUND CARE
12/16/20 1120 Wound Foot Right;Plantar #1 Date First Assessed/Time First Assessed: 09/09/20 1050   Present on Hospital Admission: Yes  Wound Approximate Age at First Assessment (Weeks): 8 weeks  Primary Wound Type: Diabetic  Location: Foot  Wound Location Orientation: Right;Plantar  Wound Kiran. .. Wound Etiology Diabetic Dorie Davison 2 Dressing Status Clean;Dry; Intact Cleansed Cleansed with saline Dressing/Treatment  
(acticoat, gauze) Wound Length (cm) 0.8 cm Wound Width (cm) 0.9 cm Wound Depth (cm) 0.5 cm Wound Surface Area (cm^2) 0.72 cm^2 Change in Wound Size % 80.43 Wound Volume (cm^3) 0.36 cm^3 Wound Healing % 86 Wound Assessment Granulation tissue Drainage Amount Small Drainage Description Serosanguinous Wound Odor None Susana-Wound/Incision Assessment Hyperkeratosis (Callous) Wound Heel Left;Lateral #2 Date First Assessed/Time First Assessed: 09/09/20 1051   Present on Hospital Admission: Yes  Wound Approximate Age at First Assessment (Weeks): 7 weeks  Primary Wound Type: Diabetic  Location: Heel  Wound Location Orientation: Left;Lateral  Wound Desc. .. Wound Etiology Diabetic Dorie Davison 2 Dressing Status Clean;Dry; Intact Cleansed Cleansed with saline Dressing/Treatment  
(acticoat, gauze) Wound Length (cm) 4.6 cm Wound Width (cm) 2.8 cm Wound Depth (cm) 0.1 cm Wound Surface Area (cm^2) 12.88 cm^2 Change in Wound Size % 53.16 Wound Volume (cm^3) 1.29 cm^3 Wound Healing % 77 Wound Assessment Granulation tissue Drainage Amount Moderate Drainage Description Serosanguinous Wound Odor None Susana-Wound/Incision Assessment Dry/flaky Wound Thickness Description Full thickness  
patient is taking aspirin

## 2020-12-16 NOTE — ASSESSMENT & PLAN NOTE
Wound continues to show improvement with minimal depth remaining and healthy quality to the base. Continue acticoat three times a week. Continue darco shoe. Return in 2 weeks - patient requests 4 weeks.

## 2020-12-16 NOTE — ASSESSMENT & PLAN NOTE
Wound continues with thick callusing and stalled size. She has the peg insole but has not worn it. She is to remove the pegs to the wound area but keep them so they can be put back in place as the wound reduces in size. Wear at all times when weight bearing. Continue acticoat three times a week.

## 2020-12-16 NOTE — PROGRESS NOTES
[address] [place] [address] [address], [address] RECORD NUMBER:  [id number]  AGE: 50 y.o. RACE BLACK OR   GENDER: female  : 1965  EPISODE DATE:  2020    Subjective:     Chief Complaint   Patient presents with    Follow-up     right foot and left heel wound        Krishan Cates is a 50 y.o. 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 She continues to work. She is wearing a darco shoe to the left and received the darco shoe with peg insole to the right but is not wearing it.       HISTORY of PRESENT ILLNESS HPI    Nature: Painless  Location: as above  Duration: 2020  Onset: Patient states it started as pressure  Course: Improving  Aggravating/Alleviating: Worsened with pressure and dependency, improved with rest  Treatment: acticoat    Ulcer Identification:  Ulcer Type: diabetic    Contributing Factors: diabetes, chronic pressure and obesity    Wound: mahoney 2         PAST MEDICAL HISTORY    Past Medical History:   Diagnosis Date    Acute diastolic CHF (congestive heart failure) ([address] 3/19/9756    Acute systolic heart failure ([address] 2016    CHF (congestive heart failure) ([address] 2016    Chronic systolic heart failure ([address] 3/3/2016    Diabetes ([address]     diet controlled    Diabetic ulcer of left foot associated with type 2 diabetes mellitus, with fat layer exposed ([address] 2020    DVT (deep venous thrombosis) ([address] 2016    Dyspnea 2016    HTN (hypertension) 2016    Hypertension     in past    Morbid obesity ([address] 2016    SVT (supraventricular tachycardia) ([address] 2016    Ventricular tachycardia (paroxysmal) ([address] 2016        PAST SURGICAL HISTORY    Past Surgical History:   Procedure Laterality Date    ABDOMEN SURGERY PROC UNLISTED gastric bypass    GASTRIC BYPASS,OBESE<150CM REYNALDO-EN-Y      HX PACEMAKER         FAMILY HISTORY    History reviewed. No pertinent family history. SOCIAL HISTORY    Social History     Tobacco Use    Smoking status: Never Smoker    Smokeless tobacco: Never Used   Substance Use Topics    Alcohol use: No    Drug use: No       ALLERGIES    Allergies   Allergen Reactions    Metformin Nausea Only       MEDICATIONS    Current Outpatient Medications on File Prior to Encounter   Medication Sig Dispense Refill    gabapentin (NEURONTIN) 300 mg capsule Take 300 mg by mouth three (3) times daily.  losartan (COZAAR) 50 mg tablet Take 1 Tab by mouth daily. 30 Tab 1    multivitamin, tx-iron-ca-min (THERA-M W/ IRON) 9 mg iron-400 mcg tab tablet Take 1 Tab by mouth daily.  aspirin (ASPIRIN) 325 mg tablet Take 1 Tab by mouth daily. 90 Tab 3    spironolactone (ALDACTONE) 25 mg tablet Take  by mouth daily.  furosemide (LASIX) 40 mg tablet Take 1 Tab by mouth two (2) times a day. /Dr Sissy [facility]oi [facility] Lic #[id number] (Patient taking differently: Take 40 mg by mouth daily. /Dr Sissy [facility] Lic #[id number] Z#[id number]) 60 Tab 0    carvedilol (COREG) 25 mg tablet Take 1 Tab by mouth two (2) times daily (with meals). /Dr Sissy [facility] Lic #[id number] [facility]#[id number] 60 Tab 0     No current facility-administered medications on file prior to encounter. REVIEW OF SYSTEMS    A comprehensive review of systems was negative except for that written in the HPI. Objective:     Visit Vitals  BP (!) 159/111 (BP 1 Location: Left arm, BP Patient Position: At rest)   Pulse 96   Temp 97.6 °F (36.4 °C)   Ht 5' 9\" (1.753 m)   Wt (!) 225.4 kg (497 lb)   BMI 73.39 kg/m²       Wt Readings from Last 3 Encounters:   12/16/20 (!) 225.4 kg (497 lb)   11/04/20 (!) 224.6 kg (495 lb 3.2 oz)   10/21/20 (!) 222.7 kg (491 lb)       PHYSICAL EXAM    General: well developed, well nourished, pleasant , NAD.  Hygiene good  Psych: cooperative. Pleasant. No anxiety or depression. Normal mood and affect. HEENT: Normocephalic, atraumatic. EOMI. Conjunctiva clear. No scleral icterus. Neck: Normal range of motion. No masses. Chest: Good air entry bilaterally. Respirations nonlabored  Cardio: Normal heart sounds,no rubs, murmurs or gallops  Abdomen: Soft, nontender, nondistended, normoactive bowel sounds    Vascular: DP and PT pulses are palpable at 2/4 bilateral. Skin temperature is uniform from proximal to distal bilateral. Hair growth is absent bilateral. No erythema, edema, heat is appreciated bilateral. No evidence of cellulitis. Derm: Nails 1-5 bilateral are thickened, discolored and with subungual debris. No paronychial infections are noted. Skin is atrophic and xerotic. No subcutaneous masses or hyperpigmented lesions are present. Neuro: Epicritic sensation is absent bilateral. Protective sensation is absent with 5.07 SWMF testing to all 10 sites bilateral. Muscle tone and bulk is symmetric bilateral.  Msk: Muscle strength is 5/5 for all prime movers of the foot bilateral. ROM of all joints is full and fluid without pain. No effusions are palpable.       Full thickness ulceration is noted to the plantar lateral left heel. Hyperkeratotic rim with granular base. No focal erythema, edema, purulence. Soft end feel with no bone exposed or palpable. No undermining or sinus track is noted. No fluctuance with palpation. Wound to the sub 5th metatarsal head right foot with thick hyperkeratotic rim and granular bed. No odor. Wound Foot Right;Plantar #1 (Active)   Wound Image    11/04/20 1126   Wound Etiology Diabetic Banks 2 12/16/20 1120   Dressing Status Clean;Dry; Intact 12/16/20 1120   Cleansed Cleansed with saline 12/16/20 1120   Dressing/Treatment Adhesive wound dressing (Band-Aid) 09/09/20 1051   Offloading for Diabetic Foot Ulcers Diabetic shoes/inserts 11/04/20 1126   Wound Length (cm) 0.8 cm 12/16/20 1120   Wound Width (cm) 0.9 cm 12/16/20 1120   Wound Depth (cm) 0.5 cm 12/16/20 1120   Wound Surface Area (cm^2) 0.72 cm^2 12/16/20 1120   Change in Wound Size % 80.43 12/16/20 1120   Wound Volume (cm^3) 0.36 cm^3 12/16/20 1120   Wound Healing % 86 12/16/20 1120   Post-Procedure Length (cm) 1.2 cm 11/04/20 1126   Post-Procedure Width (cm) 1 cm 11/04/20 1126   Post-Procedure Depth (cm) 0.2 cm 11/04/20 1126   Post-Procedure Surface Area (cm^2) 1.2 cm^2 11/04/20 1126   Post-Procedure Volume (cm^3) 0.24 cm^3 11/04/20 1126   Wound Assessment Granulation tissue 12/16/20 1120   Drainage Amount Small 12/16/20 1120   Drainage Description Serosanguinous 12/16/20 1120   Wound Odor None 12/16/20 1120   Susana-Wound/Incision Assessment Hyperkeratosis (Callous) 12/16/20 1120   Wound Thickness Description Full thickness 11/04/20 1126   Number of days: 98       Wound Heel Left;Lateral #2 (Active)   Wound Image   11/04/20 1126   Wound Etiology Diabetic Banks 2 12/16/20 1120   Dressing Status Clean;Dry; Intact 12/16/20 1120   Cleansed Cleansed with saline 12/16/20 1120   Dressing/Treatment Open to air 09/09/20 1051   Wound Length (cm) 4.6 cm 12/16/20 1120   Wound Width (cm) 2.8 cm 12/16/20 1120   Wound Depth (cm) 0.1 cm 12/16/20 1120   Wound Surface Area (cm^2) 12.88 cm^2 12/16/20 1120   Change in Wound Size % 53.16 12/16/20 1120   Wound Volume (cm^3) 1.29 cm^3 12/16/20 1120   Wound Healing % 77 12/16/20 1120   Wound Assessment Granulation tissue 12/16/20 1120   Drainage Amount Moderate 12/16/20 1120   Drainage Description Serosanguinous 12/16/20 1120   Wound Odor None 12/16/20 1120   Susana-Wound/Incision Assessment Dry/flaky 12/16/20 1120   Wound Thickness Description Full thickness 12/16/20 1120   Number of days: 98       DATA REVIEW:  No results found for this or any previous visit (from the past 336 hour(s)).       Assessment/Plan:      Problem List Items Addressed This Visit        Endocrine    Diabetic ulcer of left foot associated with type 2 diabetes mellitus, with fat layer exposed ([address] - Primary     Wound continues to show improvement with minimal depth remaining and healthy quality to the base. Continue acticoat three times a week. Continue darco shoe. Return in 2 weeks - patient requests 4 weeks. Other    Right foot ulcer, with fat layer exposed ([address]     Wound continues with thick callusing and stalled size. She has the peg insole but has not worn it. She is to remove the pegs to the wound area but keep them so they can be put back in place as the wound reduces in size. Wear at all times when weight bearing. Continue acticoat three times a week. Patient instructed on the following: Follow all wound dressing instructions. Do not get dressing or wound wet. May shower if wound can be effectively kept dry. Cast covers may be purchased at [facility]. Should you experience increased redness, swelling, pain, foul odor, size of wound(s), or have a temperature over 101 degrees please contact the [address] [place] [address] Road at [phone] or if after hours contact your primary care physician or go to the hospital emergency department. Orders Placed This Encounter    WOUND CARE, DRESSING CHANGE     Left heel, Right plantar foot  Cleanse wound and periwound with wound cleanser or normal saline. Acticoat Flex 3: cut top approximate size of wound, apply to wound bed. Secure with gauze or abd and rolled gauze. Change 3x weekly.      May return to work but continue to offload as much as possible. Limit walking. Wear darco shoe to bilateral feet. Purchase peg insert for right shoe.      Standing Status:   Standing     Number of Occurrences:   1       Follow-up Information     Follow up With Specialties Details Why Contact Info [address] In 1 month  Salvatore Lewis [address] [facility] [address] [phone]           Treatment Note please see attached Discharge Instructions    Written patient dismissal instructions given to patient or POA.          Electronically signed by Antonio Bateman DPM on 12/16/2020 at 11:46 AM

## 2021-01-13 ENCOUNTER — HOSPITAL ENCOUNTER (OUTPATIENT)
Dept: WOUND CARE | Age: 56
Discharge: HOME | End: 2021-01-13
Attending: PODIATRIST
Payer: COMMERCIAL

## 2021-01-13 VITALS
WEIGHT: 293 LBS | TEMPERATURE: 97.6 F | HEART RATE: 89 BPM | HEIGHT: 69 IN | SYSTOLIC BLOOD PRESSURE: 160 MMHG | DIASTOLIC BLOOD PRESSURE: 92 MMHG | BODY MASS INDEX: 43.4 KG/M2

## 2021-01-13 DIAGNOSIS — E11.621 DIABETIC ULCER OF LEFT HEEL ASSOCIATED WITH TYPE 2 DIABETES MELLITUS, WITH FAT LAYER EXPOSED (HCC): Primary | ICD-10-CM

## 2021-01-13 DIAGNOSIS — L97.512 RIGHT FOOT ULCER, WITH FAT LAYER EXPOSED (HCC): ICD-10-CM

## 2021-01-13 DIAGNOSIS — L97.422 DIABETIC ULCER OF LEFT HEEL ASSOCIATED WITH TYPE 2 DIABETES MELLITUS, WITH FAT LAYER EXPOSED (HCC): Primary | ICD-10-CM

## 2021-01-13 PROCEDURE — 97597 DBRDMT OPN WND 1ST 20 CM/<: CPT

## 2021-01-13 NOTE — PROGRESS NOTES
[address] [place] [address] Drive [address], [address] RECORD NUMBER:  [id number]  AGE: 50 y.o. RACE BLACK OR   GENDER: female  : 1965  EPISODE DATE:  2021    Subjective:     Chief Complaint   Patient presents with    Follow-up     bilateral feet wounds        Dusty Armstrong is a 50 y.o. BLACK OR  female who presents with a wound to the right lower extremity at the sub 5th metatarsal head and to the plantar lateral left heel. She is cleansing with dakins and applying acticoat. She continues to use the peg insole to the right foot. She is questioning using leg only compression stockings due to sitting at a desk for 8 hours every day.      HISTORY of PRESENT ILLNESS HPI    Nature: Painless  Location: as above  Duration: 2020  Onset: Patient states it started as pressure  Course: Improving  Aggravating/Alleviating: Worsened with pressure and dependency, improved with rest  Treatment: acticoat    Ulcer Identification:  Ulcer Type: diabetic    Contributing Factors: edema, diabetes, chronic pressure, decreased mobility and obesity    Wound: mahoney 2         PAST MEDICAL HISTORY    Past Medical History:   Diagnosis Date    Acute diastolic CHF (congestive heart failure) ([address] 2015    Acute systolic heart failure ([address] 2016    CHF (congestive heart failure) ([address] 2016    Chronic systolic heart failure ([address] 3/3/2016    Diabetes ([address]     diet controlled    Diabetic ulcer of left foot associated with type 2 diabetes mellitus, with fat layer exposed ([address] 2020    DVT (deep venous thrombosis) ([address] 2016    Dyspnea 2016    HTN (hypertension) 2016    Hypertension     in past    Morbid obesity ([address] 2016    SVT (supraventricular tachycardia) ([address] 2016    Ventricular tachycardia (paroxysmal) ([address] 2016        PAST SURGICAL HISTORY    Past Surgical History:   Procedure Laterality Date    HX PACEMAKER      PA ABDOMEN SURGERY PROC UNLISTED      gastric bypass    PA GASTRIC BYPASS,OBESE<150CM REYNALDO-EN-Y         FAMILY HISTORY    History reviewed. No pertinent family history. SOCIAL HISTORY    Social History     Tobacco Use    Smoking status: Never Smoker    Smokeless tobacco: Never Used   Substance Use Topics    Alcohol use: No    Drug use: No       ALLERGIES    Allergies   Allergen Reactions    Metformin Nausea Only       MEDICATIONS    Current Outpatient Medications on File Prior to Encounter   Medication Sig Dispense Refill    gabapentin (NEURONTIN) 300 mg capsule Take 300 mg by mouth three (3) times daily.  losartan (COZAAR) 50 mg tablet Take 1 Tab by mouth daily. 30 Tab 1    multivitamin, tx-iron-ca-min (THERA-M W/ IRON) 9 mg iron-400 mcg tab tablet Take 1 Tab by mouth daily.  aspirin (ASPIRIN) 325 mg tablet Take 1 Tab by mouth daily. 90 Tab 3    spironolactone (ALDACTONE) 25 mg tablet Take  by mouth daily.  furosemide (LASIX) 40 mg tablet Take 1 Tab by mouth two (2) times a day. /Dr [facility] Lic #[id number] [facility]#[id number] (Patient taking differently: Take 40 mg by mouth daily. /Dr [facility] Lic #[id number] V#[id number]) 60 Tab 0    carvedilol (COREG) 25 mg tablet Take 1 Tab by mouth two (2) times daily (with meals). /Dr Forbes [facility] Lic #[id number] [facility]#[id number] 60 Tab 0     No current facility-administered medications on file prior to encounter. REVIEW OF SYSTEMS    Pertinent items are noted in HPI.     Objective:     Visit Vitals  BP (!) 160/92 (BP 1 Location: Left arm, BP Patient Position: At rest)   Pulse 89   Temp 97.6 °F (36.4 °C)   Ht 5' 9\" (1.753 m)   Wt (!) 226.3 kg (499 lb)   BMI 73.69 kg/m²       Wt Readings from Last 3 Encounters:   01/13/21 (!) 226.3 kg (499 lb)   12/16/20 (!) 225.4 kg (497 lb)   11/04/20 (!) 224.6 kg (495 lb 3.2 oz)       PHYSICAL EXAM    General: well developed, well nourished, pleasant , NAD. Hygiene good  Psych: cooperative. Pleasant. No anxiety or depression. Normal mood and affect. HEENT: Normocephalic, atraumatic. EOMI. Conjunctiva clear. No scleral icterus. Neck: Normal range of motion. No masses. Chest: Good air entry bilaterally. Respirations nonlabored  Cardio: Normal heart sounds,no rubs, murmurs or gallops  Abdomen: Soft, nontender, nondistended, normoactive bowel sounds    Vascular: DP and PT pulses are palpable at 2/4 bilateral. Skin temperature is uniform from proximal to distal bilateral. Hair growth is absent bilateral. No erythema, edema, heat is appreciated bilateral. No evidence of cellulitis. Derm: Nails 1-5 bilateral are thickened, discolored and with subungual debris. No paronychial infections are noted. Skin is atrophic and xerotic. No subcutaneous masses or hyperpigmented lesions are present. Neuro: Epicritic sensation is absent bilateral. Protective sensation is absent with 5.07 SWMF testing to all 10 sites bilateral. Muscle tone and bulk is symmetric bilateral.  Msk: Muscle strength is 5/5 for all prime movers of the foot bilateral. ROM of all joints is full and fluid without pain. No effusions are palpable.       Full thickness ulceration is noted to the plantar lateral left heel. Hyperkeratotic rim with granular base. No focal erythema, edema, purulence. Soft end feel with no bone exposed or palpable. No undermining or sinus track is noted. No fluctuance with palpation. Wound to the sub 5th metatarsal head right foot with thick hyperkeratotic rim and granular bed. No odor. DATA REVIEW:  No results found for this or any previous visit (from the past 336 hour(s)). Debridement Wound Care      Wound Foot Right;Plantar #1 (Active)   Wound Image    01/13/21 1122   Wound Etiology Diabetic Banks 2 01/13/21 1122   Dressing Status Clean;Dry; Intact 01/13/21 1122   Cleansed Cleansed with saline 01/13/21 1122   Dressing/Treatment Adhesive wound dressing (Band-Aid) 09/09/20 1051   Offloading for Diabetic Foot Ulcers Offloading boot 01/13/21 1122   Wound Length (cm) 0.7 cm 01/13/21 1122   Wound Width (cm) 0.9 cm 01/13/21 1122   Wound Depth (cm) 0.3 cm 01/13/21 1122   Wound Surface Area (cm^2) 0.63 cm^2 01/13/21 1122   Change in Wound Size % 82.88 01/13/21 1122   Wound Volume (cm^3) 0.19 cm^3 01/13/21 1122   Wound Healing % 93 01/13/21 1122   Post-Procedure Length (cm) 0.7 cm 01/13/21 1122   Post-Procedure Width (cm) 0.9 cm 01/13/21 1122   Post-Procedure Depth (cm) 0.3 cm 01/13/21 1122   Post-Procedure Surface Area (cm^2) 0.63 cm^2 01/13/21 1122   Post-Procedure Volume (cm^3) 0.19 cm^3 01/13/21 1122   Wound Assessment Pink/red 01/13/21 1122   Drainage Amount Small 01/13/21 1122   Drainage Description Serosanguinous 01/13/21 1122   Wound Odor None 01/13/21 1122   Susana-Wound/Incision Assessment Hyperkeratosis (Callous) 01/13/21 1122   Wound Thickness Description Full thickness 01/13/21 1122   Number of days: 126       Wound Heel Left;Lateral #2 (Active)   Wound Image    01/13/21 1122   Wound Etiology Diabetic Banks 2 01/13/21 1122   Dressing Status Clean;Dry; Intact 01/13/21 1122   Cleansed Cleansed with saline 01/13/21 1122   Dressing/Treatment Open to air 09/09/20 1051   Offloading for Diabetic Foot Ulcers Offloading boot 01/13/21 1122   Wound Length (cm) 4.5 cm 01/13/21 1122   Wound Width (cm) 2.7 cm 01/13/21 1122   Wound Depth (cm) 0.1 cm 01/13/21 1122   Wound Surface Area (cm^2) 12.15 cm^2 01/13/21 1122   Change in Wound Size % 55.82 01/13/21 1122   Wound Volume (cm^3) 1.22 cm^3 01/13/21 1122   Wound Healing % 78 01/13/21 1122   Post-Procedure Length (cm) 4.5 cm 01/13/21 1122   Post-Procedure Width (cm) 2.7 cm 01/13/21 1122   Post-Procedure Depth (cm) 0.1 cm 01/13/21 1122   Post-Procedure Surface Area (cm^2) 12.15 cm^2 01/13/21 1122   Post-Procedure Volume (cm^3) 1.22 cm^3 01/13/21 1122   Wound Assessment Pink/red 01/13/21 1122   Drainage Amount Moderate 01/13/21 1122   Drainage Description Serosanguinous 01/13/21 1122   Wound Odor None 01/13/21 1122   Susana-Wound/Incision Assessment Dry/flaky 01/13/21 1122   Wound Thickness Description Full thickness 01/13/21 1122   Number of days: 126        Procedure Note  Indications:  Based on my examination of this patient's wound(s)/ulcer(s) today, debridement is required to promote healing and evaluate the wound base. Performed by: Shaunna Justice DPM    Consent obtained: Yes    Time out taken: Yes    Debridement: Excisional    Using # 10 blade scalpel the wound(s)/ulcer(s) was/were sharply debrided down through and including the removal of    subcutaneous tissue    Devitalized Tissue Debrided: biofilm, slough and exudate    Pre Debridement Measurements:  Are located in the Wound/Ulcer Documentation Flow Sheet    Wound/Ulcer #: right sub 5th metatarsal head, left plantar lateral heel    Post Debridement Measurements:  Wound/Ulcer Descriptions are Pre Debridement except measurements:Diabetic ulcer, fat layer exposed    Percent of Wound(s)/Ulcer(s) Debrided: 100%    Total Surface Area Debrided:  12.78 sq cm     Estimated Blood Loss:  Minimal     Hemostasis Achieved: Pressure    Procedural Pain: 0 / 10     Post Procedural Pain: 0 / 10     Response to treatment: Well tolerated by patient     Assessment and Plan:     Problem List Items Addressed This Visit        Endocrine    Diabetic ulcer of left foot associated with type 2 diabetes mellitus, with fat layer exposed ([address] - Primary     Wound is responding well to offloading and acticoat. Continue both. Discussed addition of compression and she will begin use of 20-30 mmHg compression stockings. Return in 1 month per patient ability. Other    Right foot ulcer, with fat layer exposed ([address]     Wound is responding well to offloading and acticoat. Continue use of peg insole to the right foot. Continue acticoat every other day.  Discussed addition of compression and she will begin use of 20-30 mmHg compression stockings. Return in 1 month per patient ability. Patient instructed on the following: Follow all wound dressing instructions. Do not get dressing or wound wet. May shower if wound can be effectively kept dry. Cast covers may be purchased at [facility]. Should you experience increased redness, swelling, pain, foul odor, size of wound(s), or have a temperature over 101 degrees please contact the [address] [place] [address] Road at [phone] or if after hours contact your primary care physician or go to the hospital emergency department. Orders Placed This Encounter    WOUND CARE, DRESSING CHANGE     Left heel, Right plantar foot  Cleanse wound and periwound with wound cleanser or normal saline. Acticoat Flex 3: cut top approximate size of wound, apply to wound bed. Secure with gauze or abd and rolled gauze. Change 3x weekly.      May return to work but continue to offload as much as possible. Limit walking. Wear darco shoe to bilateral feet. Purchase peg insert for right shoe. Standing Status:   Standing     Number of Occurrences:   1       Follow-up Information     Follow up With Specialties Details Why Contact Info    [address] [place] [address] [place] [address] In 1 month  [facility] [address] Alejo Preciado [address] [place] [id number] [phone]           Treatment Note please see attached Discharge Instructions    Written patient dismissal instructions given to patient or POA.         Electronically signed by Layla Stevenson DPM on 1/13/2021 at 11:36 AM

## 2021-01-13 NOTE — WOUND CARE
01/13/21 1122 Wound Foot Right;Plantar #1 Date First Assessed/Time First Assessed: 09/09/20 1050   Present on Hospital Admission: Yes  Wound Approximate Age at First Assessment (Weeks): 8 weeks  Primary Wound Type: Diabetic  Location: Foot  Wound Location Orientation: Right;Plantar  Wound Kiran. .. Wound Image Wound Etiology Diabetic Cornelious Nina 2 Dressing Status Clean;Dry; Intact Cleansed Cleansed with saline Dressing/Treatment  
(acticoat, foam) Offloading for Diabetic Foot Ulcers Offloading boot Wound Length (cm) 0.7 cm Wound Width (cm) 0.9 cm Wound Depth (cm) 0.3 cm Wound Surface Area (cm^2) 0.63 cm^2 Change in Wound Size % 82.88 Wound Volume (cm^3) 0.19 cm^3 Wound Healing % 93 Wound Assessment Pink/red Drainage Amount Small Drainage Description Serosanguinous Wound Odor None Susana-Wound/Incision Assessment Hyperkeratosis (Callous) Wound Thickness Description Full thickness Wound Heel Left;Lateral #2 Date First Assessed/Time First Assessed: 09/09/20 1051   Present on Hospital Admission: Yes  Wound Approximate Age at First Assessment (Weeks): 7 weeks  Primary Wound Type: Diabetic  Location: Heel  Wound Location Orientation: Left;Lateral  Wound Desc. .. Wound Image Wound Etiology Diabetic Cornelious Nina 2 Dressing Status Clean;Dry; Intact Cleansed Cleansed with saline Dressing/Treatment  
(acticoat, abd, rolled gauze) Offloading for Diabetic Foot Ulcers Offloading boot Wound Length (cm) 4.5 cm Wound Width (cm) 2.7 cm Wound Depth (cm) 0.1 cm Wound Surface Area (cm^2) 12.15 cm^2 Change in Wound Size % 55.82 Wound Volume (cm^3) 1.22 cm^3 Wound Healing % 78 Wound Assessment Pink/red Drainage Amount Moderate Drainage Description Serosanguinous Wound Odor None Susana-Wound/Incision Assessment Dry/flaky Wound Thickness Description Full thickness  
patient is taking aspirin

## 2021-01-13 NOTE — ASSESSMENT & PLAN NOTE
Wound is responding well to offloading and acticoat. Continue both. Discussed addition of compression and she will begin use of 20-30 mmHg compression stockings. Return in 1 month per patient ability.

## 2021-01-13 NOTE — WOUND CARE
[address] [place] [address], [address] [address] Phone: [phone] Fax: [phone] Patient: Scooby Aguayo MRN: [id number]  SSN: [id number] YOB: 1965  Age: 50 y.o. Sex: female Return Appointment: 1 month with Lidia Watson DPM 
 
Instructions: Left heel, Right plantar foot Cleanse wound and periwound with wound cleanser or normal saline. Acticoat Flex 3: cut top approximate size of wound, apply to wound bed. Secure with gauze or abd and rolled gauze. Change 3x weekly.  
  
May return to work but continue to offload as much as possible. Limit walking. Wear darco shoe to bilateral feet. Purchase peg insert for right shoe. Should you experience increased redness, swelling, pain, foul odor, size of wound(s), or have a temperature over 101 degrees please contact the [address] [place] [address] Road at [phone] or if after hours contact your primary care physician or go to the hospital emergency department. Signed By: Clemente Cagle RN   
 January 13, 2021

## 2021-01-13 NOTE — ASSESSMENT & PLAN NOTE
Wound is responding well to offloading and acticoat. Continue use of peg insole to the right foot. Continue acticoat every other day. Discussed addition of compression and she will begin use of 20-30 mmHg compression stockings. Return in 1 month per patient ability.

## 2021-02-10 ENCOUNTER — HOSPITAL ENCOUNTER (OUTPATIENT)
Dept: WOUND CARE | Age: 56
Discharge: HOME | End: 2021-02-10
Attending: PODIATRIST
Payer: COMMERCIAL

## 2021-02-10 VITALS
WEIGHT: 293 LBS | HEART RATE: 94 BPM | BODY MASS INDEX: 43.4 KG/M2 | SYSTOLIC BLOOD PRESSURE: 167 MMHG | TEMPERATURE: 98.1 F | DIASTOLIC BLOOD PRESSURE: 96 MMHG | RESPIRATION RATE: 20 BRPM | HEIGHT: 69 IN

## 2021-02-10 DIAGNOSIS — L97.512 RIGHT FOOT ULCER, WITH FAT LAYER EXPOSED (HCC): ICD-10-CM

## 2021-02-10 DIAGNOSIS — L97.422 DIABETIC ULCER OF LEFT HEEL ASSOCIATED WITH TYPE 2 DIABETES MELLITUS, WITH FAT LAYER EXPOSED (HCC): Primary | ICD-10-CM

## 2021-02-10 DIAGNOSIS — E11.621 DIABETIC ULCER OF LEFT HEEL ASSOCIATED WITH TYPE 2 DIABETES MELLITUS, WITH FAT LAYER EXPOSED (HCC): Primary | ICD-10-CM

## 2021-02-10 PROCEDURE — 99213 OFFICE O/P EST LOW 20 MIN: CPT

## 2021-02-10 NOTE — DISCHARGE INSTRUCTIONS
Marquis S[address] Hernadez  [address], [address]   Phone: [phone]  Fax: [phone]    Patient: Brandi Hawk MRN: [id number]  SSN: [id number]    YOB: 1965  Age: 50 y.o. Sex: female       Return Appointment: 3 weeks with Dayo Sun DPM    Instructions: Right plantar foot/ Left heel  Cleanse wound and periwound with wound cleanser or normal saline. Puracol-AG to wound bed. Secure with gauze or abd and rolled gauze or Covrsite. Change 3x weekly or as needed for drainage. Continue to offload with darco shoes. Plan for dermagraft at next visit in 3 weeks if insurance approves. Follow up in wound center in 3 weeks. Should you experience increased redness, swelling, pain, foul odor, size of wound(s), or have a temperature over 101 degrees please contact the [address] [place] [address] Road at [phone] or if after hours contact your primary care physician or go to the hospital emergency department.     Signed By: Jose G Garcia     February 10, 2021

## 2021-02-10 NOTE — ASSESSMENT & PLAN NOTE
Wounds remain stable. She is only able to travel here every 3 weeks due to the distance. I believe this is hindering her healing. Discussed use of skin substitute to aide helaing however this requires weekly care. Recommend she transfer to the [address] [facility] near her in [facility]. She would like to continue care here and will speak with her boss about Wednesday scheduling. Will begin silver collagen three times a week int he meantime. Return in 3 weeks.

## 2021-02-10 NOTE — WOUND CARE
[address] [place] [address], [address] Phone: [phone] Fax: [phone] Patient: Harjeet Rocha MRN: [id number]  SSN: [id number] YOB: 1965  Age: 50 y.o. Sex: female Return Appointment: 3 weeks with Mignon Polk DPM 
 
Instructions: Right plantar foot/ Left heel Cleanse wound and periwound with wound cleanser or normal saline. Puracol-AG to wound bed. Secure with gauze or abd and rolled gauze or Covrsite. Change 3x weekly or as needed for drainage. Continue to offload with darco shoes. Plan for dermagraft at next visit in 3 weeks if insurance approves. Follow up in wound center in 3 weeks. Should you experience increased redness, swelling, pain, foul odor, size of wound(s), or have a temperature over 101 degrees please contact the [address] [place] [address] Road at [phone] or if after hours contact your primary care physician or go to the hospital emergency department. Signed By: Malachi Uriostegui February 10, 2021

## 2021-02-10 NOTE — WOUND CARE
02/10/21 1134 Wound Foot Right;Plantar #1 Date First Assessed/Time First Assessed: 09/09/20 1050   Present on Hospital Admission: Yes  Wound Approximate Age at First Assessment (Weeks): 8 weeks  Primary Wound Type: Diabetic  Location: Foot  Wound Location Orientation: Right;Plantar  Wound Kiran. .. Wound Image Wound Etiology Diabetic Erenest Duck 2 Dressing Status Clean;Dry; Intact Cleansed Cleansed with saline Dressing/Treatment  
(acticoat, gauze, covrsite) Offloading for Diabetic Foot Ulcers Offloading boot Wound Length (cm) 0.5 cm Wound Width (cm) 0.5 cm Wound Depth (cm) 0.3 cm Wound Surface Area (cm^2) 0.25 cm^2 Change in Wound Size % 93.21 Wound Volume (cm^3) 0.08 cm^3 Wound Healing % 97 Wound Assessment Pink/red Drainage Amount Moderate Drainage Description Serosanguinous Wound Odor None Susana-Wound/Incision Assessment Hyperkeratosis (Callous) Wound Thickness Description Full thickness Wound Heel Left;Lateral #2 Date First Assessed/Time First Assessed: 09/09/20 1051   Present on Hospital Admission: Yes  Wound Approximate Age at First Assessment (Weeks): 7 weeks  Primary Wound Type: Diabetic  Location: Heel  Wound Location Orientation: Left;Lateral  Wound Desc. .. Wound Etiology Diabetic Erenest Duck 2 Dressing Status Clean;Dry; Intact Cleansed Cleansed with saline Dressing/Treatment  
(acticoat, gauze, rolled gauze) Offloading for Diabetic Foot Ulcers Offloading boot Wound Length (cm) 4.1 cm Wound Width (cm) 2.4 cm Wound Depth (cm) 0.1 cm Wound Surface Area (cm^2) 9.84 cm^2 Change in Wound Size % 64.22 Wound Volume (cm^3) 0.98 cm^3 Wound Healing % 82 Wound Assessment Pink/red Drainage Amount Moderate Drainage Description Serosanguinous Wound Odor None Susana-Wound/Incision Assessment Hyperkeratosis (Callous) Wound Thickness Description Full thickness Patient is on an anti coagulant daily asa

## 2021-02-10 NOTE — PROGRESS NOTES
[address] [place] [address] [address], [address] RECORD NUMBER:  [id number]  AGE: 50 y.o. RACE BLACK OR   GENDER: female  : 1965  EPISODE DATE:  2/10/2021    Subjective:     Chief Complaint   Patient presents with    Follow-up     right /Left feet        Scooby Aguayo is a 50 y.o. BLACK OR  female who presents with a wound to the right lower extremity at the sub 5th metatarsal head and to the plantar lateral left heel. She is cleansing with dakins and applying acticoat.  She continues to use the peg insole to the right foot.       HISTORY of PRESENT ILLNESS HPI     Nature: Painless  Location: as above  Duration: 2020  Onset: Patient states it started as pressure  Course: stable  Aggravating/Alleviating: Worsened with pressure and dependency, improved with rest  Treatment: acticoat     Ulcer Identification:  Ulcer Type: diabetic     Contributing Factors: edema, diabetes, chronic pressure, decreased mobility and obesity     Wound: mahoney 2         PAST MEDICAL HISTORY    Past Medical History:   Diagnosis Date    Acute diastolic CHF (congestive heart failure) ([address]     Acute systolic heart failure ([address] 2016    CHF (congestive heart failure) ([address] 2016    Chronic systolic heart failure ([address] 3/3/2016    Diabetes ([address]     diet controlled    Diabetic ulcer of left foot associated with type 2 diabetes mellitus, with fat layer exposed ([address] 2020    DVT (deep venous thrombosis) ([address] 2016    Dyspnea 2016    HTN (hypertension) 2016    Hypertension     in past    Morbid obesity ([address] 2016    SVT (supraventricular tachycardia) ([address] 2016    Ventricular tachycardia (paroxysmal) ([address] 2016        PAST SURGICAL HISTORY    Past Surgical History:   Procedure Laterality Date    HX PACEMAKER      MT ABDOMEN SURGERY PROC UNLISTED gastric bypass    KS GASTRIC BYPASS,OBESE<150CM REYNALDO-EN-Y         FAMILY HISTORY    History reviewed. No pertinent family history. SOCIAL HISTORY    Social History     Tobacco Use    Smoking status: Never Smoker    Smokeless tobacco: Never Used   Substance Use Topics    Alcohol use: No    Drug use: No       ALLERGIES    Allergies   Allergen Reactions    Metformin Nausea Only       MEDICATIONS    Current Outpatient Medications on File Prior to Encounter   Medication Sig Dispense Refill    gabapentin (NEURONTIN) 300 mg capsule Take 300 mg by mouth three (3) times daily.  losartan (COZAAR) 50 mg tablet Take 1 Tab by mouth daily. 30 Tab 1    multivitamin, tx-iron-ca-min (THERA-M W/ IRON) 9 mg iron-400 mcg tab tablet Take 1 Tab by mouth daily.  aspirin (ASPIRIN) 325 mg tablet Take 1 Tab by mouth daily. 90 Tab 3    spironolactone (ALDACTONE) 25 mg tablet Take  by mouth daily.  furosemide (LASIX) 40 mg tablet Take 1 Tab by mouth two (2) times a day. /[facility] Lic #[id number] V#[id number] (Patient taking differently: Take 40 mg by mouth daily. /[facility] Lic #[id number] [facility]#[id number]) 60 Tab 0    carvedilol (COREG) 25 mg tablet Take 1 Tab by mouth two (2) times daily (with meals). /[facility] Lic #[id number] [facility]#[id number] 60 Tab 0     No current facility-administered medications on file prior to encounter. REVIEW OF SYSTEMS    Pertinent items are noted in HPI. Objective:     Visit Vitals  BP (!) 167/96 (BP 1 Location: Left lower arm, BP Patient Position: Sitting)   Pulse 94   Temp 98.1 °F (36.7 °C)   Resp 20   Ht 5' 9\" (1.753 m)   Wt (!) 227 kg (500 lb 6.4 oz)   BMI 73.90 kg/m²       Wt Readings from Last 3 Encounters:   02/10/21 (!) 227 kg (500 lb 6.4 oz)   01/13/21 (!) 226.3 kg (499 lb)   12/16/20 (!) 225.4 kg (497 lb)       PHYSICAL EXAM    General: well developed, well nourished, pleasant , NAD. Hygiene good  Psych: cooperative. Pleasant. No anxiety or depression. Normal mood and affect. HEENT: Normocephalic, atraumatic. EOMI. Conjunctiva clear. No scleral icterus. Neck: Normal range of motion. No masses. Chest: Good air entry bilaterally. Respirations nonlabored  Cardio: Normal heart sounds,no rubs, murmurs or gallops  Abdomen: Soft, nontender, nondistended, normoactive bowel sounds    Vascular: DP and PT pulses are palpable at 2/4 bilateral. Skin temperature is uniform from proximal to distal bilateral. Hair growth is absent bilateral. No erythema, edema, heat is appreciated bilateral. No evidence of cellulitis. Derm: Nails 1-5 bilateral are thickened, discolored and with subungual debris. No paronychial infections are noted. Skin is atrophic and xerotic. No subcutaneous masses or hyperpigmented lesions are present. Neuro: Epicritic sensation is absent bilateral. Protective sensation is absent with 5.07 SWMF testing to all 10 sites bilateral. Muscle tone and bulk is symmetric bilateral.  Msk: Muscle strength is 5/5 for all prime movers of the foot bilateral. ROM of all joints is full and fluid without pain. No effusions are palpable.       Full thickness ulceration is noted to the plantar lateral left heel. Hyperkeratotic rim with granular base. No focal erythema, edema, purulence. Soft end feel with no bone exposed or palpable. No undermining or sinus track is noted. No fluctuance with palpation. Wound to the sub 5th metatarsal head right foot with thick hyperkeratotic rim and granular bed. No odor.     Wound Foot Right;Plantar #1 (Active)   Wound Image   02/10/21 1134   Wound Etiology Diabetic Banks 2 02/10/21 1134   Dressing Status Clean;Dry; Intact 02/10/21 1134   Cleansed Cleansed with saline 02/10/21 1134   Dressing/Treatment Adhesive wound dressing (Band-Aid) 09/09/20 1051   Offloading for Diabetic Foot Ulcers Offloading boot 02/10/21 1134   Wound Length (cm) 0.5 cm 02/10/21 1134   Wound Width (cm) 0.5 cm 02/10/21 1134   Wound Depth (cm) 0.3 cm 02/10/21 1134 Wound Surface Area (cm^2) 0.25 cm^2 02/10/21 1134   Change in Wound Size % 93.21 02/10/21 1134   Wound Volume (cm^3) 0.08 cm^3 02/10/21 1134   Wound Healing % 97 02/10/21 1134   Post-Procedure Length (cm) 0.7 cm 01/13/21 1122   Post-Procedure Width (cm) 0.9 cm 01/13/21 1122   Post-Procedure Depth (cm) 0.3 cm 01/13/21 1122   Post-Procedure Surface Area (cm^2) 0.63 cm^2 01/13/21 1122   Post-Procedure Volume (cm^3) 0.19 cm^3 01/13/21 1122   Wound Assessment Pink/red 02/10/21 1134   Drainage Amount Moderate 02/10/21 1134   Drainage Description Serosanguinous 02/10/21 1134   Wound Odor None 02/10/21 1134   Susana-Wound/Incision Assessment Hyperkeratosis (Callous) 02/10/21 1134   Wound Thickness Description Full thickness 02/10/21 1134   Number of days: 154       Wound Heel Left;Lateral #2 (Active)   Wound Image    01/13/21 1122   Wound Etiology Diabetic Banks 2 02/10/21 1134   Dressing Status Clean;Dry; Intact 02/10/21 1134   Cleansed Cleansed with saline 02/10/21 1134   Dressing/Treatment Open to air 09/09/20 1051   Offloading for Diabetic Foot Ulcers Offloading boot 02/10/21 1134   Wound Length (cm) 4.1 cm 02/10/21 1134   Wound Width (cm) 2.4 cm 02/10/21 1134   Wound Depth (cm) 0.1 cm 02/10/21 1134   Wound Surface Area (cm^2) 9.84 cm^2 02/10/21 1134   Change in Wound Size % 64.22 02/10/21 1134   Wound Volume (cm^3) 0.98 cm^3 02/10/21 1134   Wound Healing % 82 02/10/21 1134   Post-Procedure Length (cm) 4.5 cm 01/13/21 1122   Post-Procedure Width (cm) 2.7 cm 01/13/21 1122   Post-Procedure Depth (cm) 0.1 cm 01/13/21 1122   Post-Procedure Surface Area (cm^2) 12.15 cm^2 01/13/21 1122   Post-Procedure Volume (cm^3) 1.22 cm^3 01/13/21 1122   Wound Assessment Pink/red 02/10/21 1134   Drainage Amount Moderate 02/10/21 1134   Drainage Description Serosanguinous 02/10/21 1134   Wound Odor None 02/10/21 1134   Susana-Wound/Incision Assessment Hyperkeratosis (Callous) 02/10/21 1134   Wound Thickness Description Full thickness 02/10/21 1134   Number of days: 154       DATA REVIEW:  No results found for this or any previous visit (from the past 336 hour(s)). Assessment/Plan:      Problem List Items Addressed This Visit        Endocrine    Diabetic ulcer of left foot associated with type 2 diabetes mellitus, with fat layer exposed ([address] - Primary     Wounds remain stable. She is only able to travel here every 3 weeks due to the distance. I believe this is hindering her healing. Discussed use of skin substitute to aide helaing however this requires weekly care. Recommend she transfer to the [address] [facility] near her in [facility]. She would like to continue care here and will speak with her boss about Wednesday scheduling. Will begin silver collagen three times a week int he meantime. Return in 3 weeks. Other    Right foot ulcer, with fat layer exposed ([address]          Patient instructed on the following: Follow all wound dressing instructions. Do not get dressing or wound wet. May shower if wound can be effectively kept dry. Cast covers may be purchased at [facility]. Should you experience increased redness, swelling, pain, foul odor, size of wound(s), or have a temperature over 101 degrees please contact the [address] [place] [address] Road at [phone] or if after hours contact your primary care physician or go to the hospital emergency department. Orders Placed This Encounter    WOUND CARE, DRESSING CHANGE     Right plantar foot/ Left heel  Cleanse wound and periwound with wound cleanser or normal saline. Puracol-AG to wound bed. Secure with gauze or abd and rolled gauze or Covrsite. Change 3x weekly or as needed for drainage. Continue to offload with darco shoes. Plan for dermagraft at next visit in 3 weeks if insurance approves. Follow up in wound center in 3 weeks.      Standing Status:   Standing     Number of Occurrences:   1       Follow-up Information     Follow up With Specialties Details Why Contact Info [facility] WOUND CARE Wound Care In 3 weeks  [facility] [address] [facility] [address] [phone]           Treatment Note please see attached Discharge Instructions    Written patient dismissal instructions given to patient or POA.          Electronically signed by Donovan Walter DPM on 2/10/2021 at 12:09 PM

## 2021-03-03 ENCOUNTER — HOSPITAL ENCOUNTER (OUTPATIENT)
Dept: WOUND CARE | Age: 56
Discharge: HOME | End: 2021-03-03
Attending: PODIATRIST
Payer: COMMERCIAL

## 2021-03-03 VITALS
BODY MASS INDEX: 43.4 KG/M2 | WEIGHT: 293 LBS | HEIGHT: 69 IN | SYSTOLIC BLOOD PRESSURE: 140 MMHG | RESPIRATION RATE: 20 BRPM | HEART RATE: 87 BPM | DIASTOLIC BLOOD PRESSURE: 85 MMHG | TEMPERATURE: 97.3 F

## 2021-03-03 DIAGNOSIS — E11.621 DIABETIC ULCER OF LEFT HEEL ASSOCIATED WITH TYPE 2 DIABETES MELLITUS, WITH FAT LAYER EXPOSED (HCC): Primary | ICD-10-CM

## 2021-03-03 DIAGNOSIS — L97.422 DIABETIC ULCER OF LEFT HEEL ASSOCIATED WITH TYPE 2 DIABETES MELLITUS, WITH FAT LAYER EXPOSED (HCC): Primary | ICD-10-CM

## 2021-03-03 DIAGNOSIS — L97.512 RIGHT FOOT ULCER, WITH FAT LAYER EXPOSED (HCC): ICD-10-CM

## 2021-03-03 PROCEDURE — 99213 OFFICE O/P EST LOW 20 MIN: CPT

## 2021-03-03 NOTE — DISCHARGE INSTRUCTIONS
[address]  [address], [address]  Phone: [phone]  Fax: [phone]    Patient: Kim Robertson MRN: [id number]  SSN: [id number]    YOB: 1965  Age: 50 y.o. Sex: female       Return Appointment: 2 weeks with Jessica Ayala DPM In her private office    Instructions: Right plantar foot/ Left heel  Cleanse wound and periwound with wound cleanser or normal saline. Puracol-AG to wound bed. Secure with gauze or abd and rolled gauze or Covrsite. Change 3x weekly or as needed for drainage. Continue to offload with darco shoes.      Plan for dermagraft at next visit in Dr Jose Forrest private office in 2 weeks if insurance approves. Follow up in wound center as needed. Should you experience increased redness, swelling, pain, foul odor, size of wound(s), or have a temperature over 101 degrees please contact the [address] [place] [address] Road at [phone] or if after hours contact your primary care physician or go to the hospital emergency department.     Signed By: Robert Ramires     March 3, 2021

## 2021-03-03 NOTE — WOUND CARE
[address] [place] [address], [facility] [address] Phone: [phone] Fax: [phone] Patient: Kenya De Souza MRN: [id number]  SSN: [id number] YOB: 1965  Age: 50 y.o. Sex: female Return Appointment: as needed with Wayland Nissen, DPM 
 
Instructions: Right plantar foot/ Left heel Cleanse wound and periwound with wound cleanser or normal saline. Puracol-AG to wound bed. Secure with gauze or abd and rolled gauze or Covrsite. Change 3x weekly or as needed for drainage. Continue to offload with darco shoes.  
  
Plan for dermagraft at next visit in [facility] [employer] private office in 2 weeks if insurance approves. Follow up in wound center as needed. Should you experience increased redness, swelling, pain, foul odor, size of wound(s), or have a temperature over 101 degrees please contact the [address] [place] [address] Road at [phone] or if after hours contact your primary care physician or go to the hospital emergency department. Signed By: Lisa Fleming March 3, 2021

## 2021-03-03 NOTE — PROGRESS NOTES
[address] [place] [address] [address], [address] RECORD NUMBER:  [id number]  AGE: 50 y.o. RACE BLACK  GENDER: female  : 1965  EPISODE DATE:  3/3/2021    Subjective:     Chief Complaint   Patient presents with    Follow-up     bilateral feet        Daisy Holman is a 50 y.o. BLACK OR  female who presents with a wound to the right lower extremity at the sub 5th metatarsal head and to the plantar lateral left heel. She is cleansing with dakins and applying acticoat.  She continues to use the peg insole to the right foot.  She spoke with her employer and will be able to come weekly for advanced care.     HISTORY of PRESENT ILLNESS HPI     Nature: Painless  Location: as above  Duration: 2020  Onset: Patient states it started as pressure  Course: stable  Aggravating/Alleviating: Worsened with pressure and dependency, improved with rest  Treatment: acticoat     Ulcer Identification:  Ulcer Type: diabetic     Contributing Factors: edema, diabetes, chronic pressure, decreased mobility and obesity     Wound: mahoney 2         PAST MEDICAL HISTORY    Past Medical History:   Diagnosis Date    Acute diastolic CHF (congestive heart failure) ([address]     Acute systolic heart failure ([address] 2016    CHF (congestive heart failure) ([address] 2016    Chronic systolic heart failure ([address] 3/3/2016    Diabetes ([address]     diet controlled    Diabetic ulcer of left foot associated with type 2 diabetes mellitus, with fat layer exposed ([address] 2020    DVT (deep venous thrombosis) ([address] 2016    Dyspnea 2016    HTN (hypertension) 2016    Hypertension     in past    Morbid obesity ([address] 2016    SVT (supraventricular tachycardia) ([address] 2016    Ventricular tachycardia (paroxysmal) ([address] 2016        PAST SURGICAL HISTORY    Past Surgical History:   Procedure Laterality Date    HX PACEMAKER      AL ABDOMEN SURGERY PROC UNLISTED      gastric bypass    AL GASTRIC BYPASS,OBESE<150CM REYNALDO-EN-Y         FAMILY HISTORY    History reviewed. No pertinent family history. SOCIAL HISTORY    Social History     Tobacco Use    Smoking status: Never Smoker    Smokeless tobacco: Never Used   Substance Use Topics    Alcohol use: No    Drug use: No       ALLERGIES    Allergies   Allergen Reactions    Metformin Nausea Only       MEDICATIONS    Current Outpatient Medications on File Prior to Encounter   Medication Sig Dispense Refill    gabapentin (NEURONTIN) 300 mg capsule Take 300 mg by mouth three (3) times daily.  losartan (COZAAR) 50 mg tablet Take 1 Tab by mouth daily. 30 Tab 1    multivitamin, tx-iron-ca-min (THERA-M W/ IRON) 9 mg iron-400 mcg tab tablet Take 1 Tab by mouth daily.  aspirin (ASPIRIN) 325 mg tablet Take 1 Tab by mouth daily. 90 Tab 3    spironolactone (ALDACTONE) 25 mg tablet Take  by mouth daily.  furosemide (LASIX) 40 mg tablet Take 1 Tab by mouth two (2) times a day. /Dr Kee Serrato [place] [facility] [employer] #[id number] DRP#[id number] (Patient taking differently: Take 40 mg by mouth daily. /Dr Kee Serrato [place] [facility] [employer] #[id number] VLO#[id number]) 60 Tab 0    carvedilol (COREG) 25 mg tablet Take 1 Tab by mouth two (2) times daily (with meals). /Dr Kee Serrato [place] Lic #[id number] DVQ#[id number] 60 Tab 0     No current facility-administered medications on file prior to encounter. REVIEW OF SYSTEMS    Pertinent items are noted in HPI. Objective:     Visit Vitals  BP (!) 140/85 (BP 1 Location: Left lower arm, BP Patient Position: Sitting)   Pulse 87   Temp 97.3 °F (36.3 °C)   Resp 20   Ht 5' 9\" (1.753 m)   Wt (!) 226.4 kg (499 lb 3.2 oz)   BMI 73.72 kg/m²       Wt Readings from Last 3 Encounters:   03/03/21 (!) 226.4 kg (499 lb 3.2 oz)   02/10/21 (!) 227 kg (500 lb 6.4 oz)   01/13/21 (!) 226.3 kg (499 lb)       PHYSICAL EXAM    General: well developed, well nourished, pleasant , NAD.  Hygiene good  Psych: cooperative. Pleasant. No anxiety or depression. Normal mood and affect. HEENT: Normocephalic, atraumatic. EOMI. Conjunctiva clear. No scleral icterus. Neck: Normal range of motion. No masses. Chest: Good air entry bilaterally. Respirations nonlabored  Cardio: Normal heart sounds,no rubs, murmurs or gallops  Abdomen: Soft, nontender, nondistended, normoactive bowel sounds    Vascular: DP and PT pulses are palpable at 2/4 bilateral. Skin temperature is uniform from proximal to distal bilateral. Hair growth is absent bilateral. No erythema, edema, heat is appreciated bilateral. No evidence of cellulitis. Derm: Nails 1-5 bilateral are thickened, discolored and with subungual debris. No paronychial infections are noted. Skin is atrophic and xerotic. No subcutaneous masses or hyperpigmented lesions are present. Neuro: Epicritic sensation is absent bilateral. Protective sensation is absent with 5.07 SWMF testing to all 10 sites bilateral. Muscle tone and bulk is symmetric bilateral.  Msk: Muscle strength is 5/5 for all prime movers of the foot bilateral. ROM of all joints is full and fluid without pain. No effusions are palpable.       Full thickness ulceration is noted to the plantar lateral left heel. Hyperkeratotic rim with granular base. No focal erythema, edema, purulence. Soft end feel with no bone exposed or palpable. No undermining or sinus track is noted. No fluctuance with palpation. Wound to the sub 5th metatarsal head right foot with thick hyperkeratotic rim and granular bed. No odor.     Wound Foot Right;Plantar #1 (Active)   Wound Image   03/03/21 1129   Wound Etiology Diabetic Banks 2 03/03/21 1129   Dressing Status Clean;Dry; Intact 03/03/21 1129   Cleansed Cleansed with saline 03/03/21 1129   Dressing/Treatment Adhesive wound dressing (Band-Aid) 09/09/20 1051   Offloading for Diabetic Foot Ulcers Offloading boot 03/03/21 1129   Wound Length (cm) 0.5 cm 03/03/21 1129   Wound Width (cm) 0.7 cm 03/03/21 1129   Wound Depth (cm) 0.2 cm 03/03/21 1129   Wound Surface Area (cm^2) 0.35 cm^2 03/03/21 1129   Change in Wound Size % 90.49 03/03/21 1129   Wound Volume (cm^3) 0.07 cm^3 03/03/21 1129   Wound Healing % 97 03/03/21 1129   Post-Procedure Length (cm) 0.7 cm 01/13/21 1122   Post-Procedure Width (cm) 0.9 cm 01/13/21 1122   Post-Procedure Depth (cm) 0.3 cm 01/13/21 1122   Post-Procedure Surface Area (cm^2) 0.63 cm^2 01/13/21 1122   Post-Procedure Volume (cm^3) 0.19 cm^3 01/13/21 1122   Wound Assessment Granulation tissue 03/03/21 1129   Drainage Amount Moderate 03/03/21 1129   Drainage Description Serosanguinous 03/03/21 1129   Wound Odor None 03/03/21 1129   Susana-Wound/Incision Assessment Hyperkeratosis (Callous) 03/03/21 1129   Wound Thickness Description Full thickness 03/03/21 1129   Number of days: 175       Wound Heel Left;Lateral #2 (Active)   Wound Image   03/03/21 1129   Wound Etiology Diabetic Banks 2 03/03/21 1129   Dressing Status Clean;Dry; Intact 03/03/21 1129   Cleansed Soap and water 03/03/21 1129   Dressing/Treatment Open to air 09/09/20 1051   Offloading for Diabetic Foot Ulcers Offloading boot 03/03/21 1129   Wound Length (cm) 4.5 cm 03/03/21 1129   Wound Width (cm) 2.5 cm 03/03/21 1129   Wound Depth (cm) 0.1 cm 03/03/21 1129   Wound Surface Area (cm^2) 11.25 cm^2 03/03/21 1129   Change in Wound Size % 59.09 03/03/21 1129   Wound Volume (cm^3) 1.12 cm^3 03/03/21 1129   Wound Healing % 80 03/03/21 1129   Post-Procedure Length (cm) 4.5 cm 01/13/21 1122   Post-Procedure Width (cm) 2.7 cm 01/13/21 1122   Post-Procedure Depth (cm) 0.1 cm 01/13/21 1122   Post-Procedure Surface Area (cm^2) 12.15 cm^2 01/13/21 1122   Post-Procedure Volume (cm^3) 1.22 cm^3 01/13/21 1122   Wound Assessment Granulation tissue 03/03/21 1129   Drainage Amount Moderate 03/03/21 1129   Drainage Description Serosanguinous 03/03/21 1129   Wound Odor None 03/03/21 1129   Susana-Wound/Incision Assessment Hyperkeratosis (Callous) 03/03/21 1129   Wound Thickness Description Full thickness 03/03/21 1129   Number of days: 175       DATA REVIEW:  No results found for this or any previous visit (from the past 336 hour(s)). Assessment/Plan:      Problem List Items Addressed This Visit        Endocrine    Diabetic ulcer of left foot associated with type 2 diabetes mellitus, with fat layer exposed ([address] - Primary     Wounds remain stalled. Skin substitute was declined by her insurance at the wound center setting. Options include continued local care or attempt to approve it in the office setting and have her come to my office. She would like try this. Will place authorization and plan to have her at my office in 2 weeks. Continue all local wound care instructions until then. Other    Right foot ulcer, with fat layer exposed ([address]          Patient instructed on the following: Follow all wound dressing instructions. Do not get dressing or wound wet. May shower if wound can be effectively kept dry. Cast covers may be purchased at [facility] and [facility]. Should you experience increased redness, swelling, pain, foul odor, size of wound(s), or have a temperature over 101 degrees please contact the [address] [place] [address] Road at [phone] or if after hours contact your primary care physician or go to the hospital emergency department. Orders Placed This Encounter    WOUND CARE, DRESSING CHANGE     Right plantar foot/ Left heel  Cleanse wound and periwound with wound cleanser or normal saline. Puracol-AG to wound bed. Secure with gauze or abd and rolled gauze or Covrsite. Change 3x weekly or as needed for drainage. Continue to offload with darco shoes.      Plan for dermagraft at next visit in Dr Jose Forrest private office in 2 weeks if insurance approves. Follow up in wound center as needed.      Standing Status:   Standing     Number of Occurrences:   1       Follow-up Information     Follow up With Specialties Details Why Contact Info    [facility]  As needed [address] [facility] [id number] [phone]           Level of MDM (Based on 2/3 elements below)  Number and Complexity of Problems Addressed Amount and/or Complexity of Data to be Reviewed and Analyzed  *Each unique test, order, or document contributes to the combination of 2 or combination of 3 in Category 1 below. Risk of Complications and/or Morbidity or Mortality of pt Management     [phone] SF  Minimal or none Minimal risk of morbidity from additional diagnostic testing or Rx   99195  31683 Low Low       1stable chronic illness;                 Treatment Note please see attached Discharge Instructions    Written patient dismissal instructions given to patient or POA.          Electronically signed by Abdoulaye Julien DPM on 3/3/2021 at 11:56 AM

## 2021-03-03 NOTE — ASSESSMENT & PLAN NOTE
Wounds remain stalled. Skin substitute was declined by her insurance at the wound center setting. Options include continued local care or attempt to approve it in the office setting and have her come to my office. She would like try this. Will place authorization and plan to have her at my office in 2 weeks. Continue all local wound care instructions until then.

## 2021-03-03 NOTE — WOUND CARE
03/03/21 1129 Wound Foot Right;Plantar #1 Date First Assessed/Time First Assessed: 09/09/20 1050   Present on Hospital Admission: Yes  Wound Approximate Age at First Assessment (Weeks): 8 weeks  Primary Wound Type: Diabetic  Location: Foot  Wound Location Orientation: Right;Plantar  Wound Kiran. .. Wound Image Wound Etiology Diabetic [place] 2 Dressing Status Clean;Dry; Intact Cleansed Cleansed with saline Offloading for Diabetic Foot Ulcers Offloading boot Wound Length (cm) 0.5 cm Wound Width (cm) 0.7 cm Wound Depth (cm) 0.2 cm Wound Surface Area (cm^2) 0.35 cm^2 Change in Wound Size % 90.49 Wound Volume (cm^3) 0.07 cm^3 Wound Healing % 97 Wound Assessment Granulation tissue Drainage Amount Moderate Drainage Description Serosanguinous Wound Odor None Susana-Wound/Incision Assessment Hyperkeratosis (Callous) Wound Thickness Description Full thickness Wound Heel Left;Lateral #2 Date First Assessed/Time First Assessed: 09/09/20 1051   Present on Hospital Admission: Yes  Wound Approximate Age at First Assessment (Weeks): 7 weeks  Primary Wound Type: Diabetic  Location: Heel  Wound Location Orientation: Left;Lateral  Wound Desc. .. Wound Image Wound Etiology Diabetic Shail[facility] 2 Dressing Status Clean;Dry; Intact Cleansed Soap and water Offloading for Diabetic Foot Ulcers Offloading boot Wound Length (cm) 4.5 cm Wound Width (cm) 2.5 cm Wound Depth (cm) 0.1 cm Wound Surface Area (cm^2) 11.25 cm^2 Change in Wound Size % 59.09 Wound Volume (cm^3) 1.12 cm^3 Wound Healing % 80 Wound Assessment Granulation tissue Drainage Amount Moderate Drainage Description Serosanguinous Wound Odor None Susana-Wound/Incision Assessment Hyperkeratosis (Callous) Wound Thickness Description Full thickness Patient is on an anti coagulant daily asa.